# Patient Record
Sex: MALE | Race: WHITE | Employment: OTHER | URBAN - METROPOLITAN AREA
[De-identification: names, ages, dates, MRNs, and addresses within clinical notes are randomized per-mention and may not be internally consistent; named-entity substitution may affect disease eponyms.]

---

## 2019-03-12 ENCOUNTER — HOSPITAL ENCOUNTER (OUTPATIENT)
Dept: GENERAL RADIOLOGY | Age: 64
Discharge: HOME | End: 2019-03-14
Payer: COMMERCIAL

## 2019-03-12 DIAGNOSIS — R13.14 DYSPHAGIA, PHARYNGOESOPHAGEAL: ICD-10-CM

## 2019-03-12 DIAGNOSIS — R13.13 CRICOPHARYNGEAL DYSPHAGIA: ICD-10-CM

## 2019-03-12 PROCEDURE — 92611 MOTION FLUOROSCOPY/SWALLOW: CPT | Performed by: SPEECH-LANGUAGE PATHOLOGIST

## 2019-03-12 PROCEDURE — 74220 X-RAY XM ESOPHAGUS 1CNTRST: CPT

## 2019-03-12 PROCEDURE — 2500000003 HC RX 250 WO HCPCS: Performed by: RADIOLOGY

## 2019-03-12 PROCEDURE — 74230 X-RAY XM SWLNG FUNCJ C+: CPT

## 2019-03-12 PROCEDURE — 71046 X-RAY EXAM CHEST 2 VIEWS: CPT

## 2019-03-12 RX ADMIN — BARIUM SULFATE 45 G: 0.6 CREAM ORAL at 10:17

## 2019-03-12 RX ADMIN — BARIUM SULFATE 88 G: 960 POWDER, FOR SUSPENSION ORAL at 10:17

## 2019-03-12 RX ADMIN — BARIUM SULFATE 176 G: 960 POWDER, FOR SUSPENSION ORAL at 11:11

## 2019-03-12 RX ADMIN — BARIUM SULFATE 140 ML: 980 POWDER, FOR SUSPENSION ORAL at 11:10

## 2019-06-07 ENCOUNTER — HOSPITAL ENCOUNTER (OUTPATIENT)
Age: 64
Discharge: HOME | End: 2019-06-09

## 2019-06-07 PROCEDURE — 87081 CULTURE SCREEN ONLY: CPT

## 2019-06-07 PROCEDURE — 88305 TISSUE EXAM BY PATHOLOGIST: CPT

## 2019-06-08 LAB — CLOTEST: NORMAL

## 2020-02-17 ENCOUNTER — TELEPHONE (OUTPATIENT)
Dept: ENT CLINIC | Age: 65
End: 2020-02-17

## 2020-07-17 ENCOUNTER — HOSPITAL ENCOUNTER (OUTPATIENT)
Dept: CT IMAGING | Age: 65
Discharge: HOME | End: 2020-07-19
Payer: COMMERCIAL

## 2020-07-17 PROCEDURE — 71250 CT THORAX DX C-: CPT

## 2020-07-22 ENCOUNTER — TELEPHONE (OUTPATIENT)
Dept: PULMONOLOGY | Age: 65
End: 2020-07-22

## 2020-07-22 NOTE — TELEPHONE ENCOUNTER
This office called the patient for him to obtain a copy of his CT Chest from [facility]. Patient very anxious regarding the results of this imaging and requested to see doctor as quickly as possible. Patient was told that if he brings in the CT disc soon doctor can view the images quicker. Patient was agreeable.

## 2020-07-23 ENCOUNTER — TELEPHONE (OUTPATIENT)
Dept: PULMONOLOGY | Age: 65
End: 2020-07-23

## 2020-07-29 ENCOUNTER — HOSPITAL ENCOUNTER (OUTPATIENT)
Dept: PET IMAGING | Age: 65
Discharge: HOME | End: 2020-07-31
Payer: COMMERCIAL

## 2020-07-29 LAB — METER GLUCOSE: 83 MG/DL (ref 74–99)

## 2020-07-29 PROCEDURE — A9552 F18 FDG: HCPCS | Performed by: RADIOLOGY

## 2020-07-29 PROCEDURE — 82962 GLUCOSE BLOOD TEST: CPT

## 2020-07-29 PROCEDURE — 78815 PET IMAGE W/CT SKULL-THIGH: CPT

## 2020-07-29 PROCEDURE — 3430000000 HC RX DIAGNOSTIC RADIOPHARMACEUTICAL: Performed by: RADIOLOGY

## 2020-07-29 RX ORDER — FLUDEOXYGLUCOSE F 18 200 MCI/ML
15 INJECTION, SOLUTION INTRAVENOUS
Status: COMPLETED | OUTPATIENT
Start: 2020-07-29 | End: 2020-07-29

## 2020-07-29 RX ADMIN — FLUDEOXYGLUCOSE F 18 15 MILLICURIE: 200 INJECTION, SOLUTION INTRAVENOUS at 09:36

## 2020-08-04 ENCOUNTER — OFFICE VISIT (OUTPATIENT)
Dept: PULMONOLOGY | Age: 65
End: 2020-08-04
Payer: COMMERCIAL

## 2020-08-04 ENCOUNTER — TELEPHONE (OUTPATIENT)
Dept: PULMONOLOGY | Age: 65
End: 2020-08-04

## 2020-08-04 PROCEDURE — 99204 OFFICE O/P NEW MOD 45 MIN: CPT | Performed by: INTERNAL MEDICINE

## 2020-08-04 NOTE — PROGRESS NOTES
Department of Internal Medicine  Division of Pulmonary, Critical Care & Sleep Medicine  Pulmonary [address] [facility]                                             Pulmonary Clinic Consult     I had the pleasure of seeing  Jacki Woody in the [facility] regarding their Lung nodule       Chief Complaint   Patient presents with    New Patient     lung mass       HISTORY OF PRESENT ILLNESS:    Jacki Woody is a 60y.o. year old  Who smoke for more than 30-35 years and has about 50 PPY smoking and work in [address] [place] [address] Ext   The Patient comes in with SOB and mid chest tightness at times and epithat has been going on for the last few  Associated with mild cough . ,He  states that it get worse with exercise or walking long distance and he can walk 1 block And go 1-2 flight of stairs before get short winded    No weight lossSleep history :  Snoring   Feel tired during the day  Wake up fresh  During the work up has CT chest that shows RUL nodule 1.6 along with other small less than 4 mm nodules  PET scan shows uptake 8 SUV   No mediastinal lesions                 ALLERGIES:    Allergies   Allergen Reactions    Ciprofloxacin      Fever         PAST MEDICAL HISTORY:       Diagnosis Date    Hypertension     unstable blood pressure       MEDICATIONS:   Current Outpatient Medications   Medication Sig Dispense Refill    naproxen (NAPROSYN) 500 MG tablet Take 1 tablet by mouth 2 times daily for 30 doses 30 tablet 0    amLODIPine-atorvastatatin (CADUET) 5-10 MG per tablet Take 1 tablet by mouth daily. No current facility-administered medications for this visit. SOCIAL AND OCCUPATIONAL HEALTH:  Social History     Tobacco Use   Smoking Status Former Smoker    Years: 20.00    Types: Cigarettes   Smokeless Tobacco Never Used         SURGICAL HISTORY:   No past surgical history on file.     FAMILY HISTORY:   Lung cancer:No  DVT or PE No     REVIEW OF SYSTEMS:  Constitutional: General health is good . There has been no weight changes. No fevers, fatigue or weakness. Head: Patient denies any history of trauma, convulsive disorder or syncope. Skin:  Patient denies history of changes in pigmentation, eruptions or pruritus. No easy bruising or bleeding. EENT: no nasal congestion   Cardiovascular ,No chest pain ,No edema ,  Respiration:SOB: + ,THURSTON :+  Gastrointestinal:No GI bleed ,no melena  ,no hematemesis    Musculoskeletal: no joint pain ,no swelling  Neurological:no , syncope. Denies twitching, convulsions, loss of consciousness or memory. Endocrine:  . No history of goiter, exophthalmos or dryness of skin. The patient has no history of diabetes. Hematopoietic:  No history of bleeding disorders or easy bruising. Rheumatic:  No connective tissue disease history or polyarthritis/inflammatory joint disease. PHYSICAL EXAMINATION:  There were no vitals filed for this visit. 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 I will keep following with you ,should you have any concerns ,please contact me at [phone]     Sincerely,        Walter English MD  Pulmonary & Critical Care Medicine     NOTE: This report was transcribed using voice recognition software.  Every effort was made to ensure accuracy; however, inadvertent computerized transcription errors may

## 2020-08-04 NOTE — PROGRESS NOTES
TeleMedicine Video Visit    This visit was performed as a virtual video visit using a Telephone. Patient identification was verified at the start of the visit, including the patient's telephone number and physical location. I discussed with the patient the nature of our telehealth visits, that:     1. Due to the nature of an audio- video modality, the only components of a physical exam that could be done are the elements supported by direct observation. 2. I would evaluate the patient and recommend diagnostics and treatments based on my assessment. 3. If it was felt that the patient should be evaluated in clinic or an emergency room setting, then they would be directed there. 4. Our sessions are not being recorded and that personal health information is protected. 5. Our team would provide follow up care in person if/when the patient needs it. Patient does agree to proceed with telemedicine consultation. Patient's IIGNXARJ:[id number] [facility] [phone]   Physician  location [address] [place] [address] other people involved in call N/A      Time spent: Greater than 30    This visit was completed virtually using telephone. Dr. Tammy Collazo called to pt and review PET scan results. Discussed options for biopsy vs. Surgical removal for mass(concern for cancer diagnosis). Discussed referral to Dr. Marquetta Harada for surgical resection and diagnostic procedure. Pt also given options for referral to any surgeon/outside hospital. Pt agrees to referral to Dr. Marquetta Harada. And office to process referral today. Plan for PFT to be scheduled and office to notify of appt.

## 2020-08-05 ENCOUNTER — TELEPHONE (OUTPATIENT)
Dept: PULMONOLOGY | Age: 65
End: 2020-08-05

## 2020-08-05 NOTE — TELEPHONE ENCOUNTER
Called patient to inform him that his PFT is scheduled for 8-14-20 at 1:30 pm at the [facility]. He must arrive by 1:00 pm. He is to have no caffeine for 24 hours prior to test and no resp meds for at least 4 hours prior to test.    I also informed him that he will need a COVID test 4 days prior to his PFT.     Patient verbalized understanding

## 2020-08-10 ENCOUNTER — HOSPITAL ENCOUNTER (OUTPATIENT)
Age: 65
Discharge: HOME | End: 2020-08-12
Payer: COMMERCIAL

## 2020-08-10 PROCEDURE — U0003 INFECTIOUS AGENT DETECTION BY NUCLEIC ACID (DNA OR RNA); SEVERE ACUTE RESPIRATORY SYNDROME CORONAVIRUS 2 (SARS-COV-2) (CORONAVIRUS DISEASE [COVID-19]), AMPLIFIED PROBE TECHNIQUE, MAKING USE OF HIGH THROUGHPUT TECHNOLOGIES AS DESCRIBED BY CMS-2020-01-R: HCPCS

## 2020-08-11 ENCOUNTER — PREP FOR PROCEDURE (OUTPATIENT)
Dept: CARDIOTHORACIC SURGERY | Age: 65
End: 2020-08-11

## 2020-08-11 ENCOUNTER — INITIAL CONSULT (OUTPATIENT)
Dept: CARDIOTHORACIC SURGERY | Age: 65
End: 2020-08-11
Payer: COMMERCIAL

## 2020-08-11 ENCOUNTER — TELEPHONE (OUTPATIENT)
Dept: CARDIOTHORACIC SURGERY | Age: 65
End: 2020-08-11

## 2020-08-11 VITALS
SYSTOLIC BLOOD PRESSURE: 142 MMHG | HEIGHT: 67 IN | WEIGHT: 185 LBS | HEART RATE: 75 BPM | BODY MASS INDEX: 29.03 KG/M2 | DIASTOLIC BLOOD PRESSURE: 80 MMHG

## 2020-08-11 DIAGNOSIS — R91.1 PULMONARY NODULE: Primary | ICD-10-CM

## 2020-08-11 LAB
SARS-COV-2: NOT DETECTED
SOURCE: NORMAL

## 2020-08-11 PROCEDURE — 99205 OFFICE O/P NEW HI 60 MIN: CPT | Performed by: THORACIC SURGERY (CARDIOTHORACIC VASCULAR SURGERY)

## 2020-08-11 RX ORDER — SODIUM CHLORIDE 9 MG/ML
INJECTION, SOLUTION INTRAVENOUS CONTINUOUS
Status: CANCELLED | OUTPATIENT
Start: 2020-08-11

## 2020-08-11 RX ORDER — ATORVASTATIN CALCIUM 10 MG/1
10 TABLET, FILM COATED ORAL DAILY
COMMUNITY

## 2020-08-11 RX ORDER — SODIUM CHLORIDE 0.9 % (FLUSH) 0.9 %
10 SYRINGE (ML) INJECTION EVERY 12 HOURS SCHEDULED
Status: CANCELLED | OUTPATIENT
Start: 2020-08-11

## 2020-08-11 RX ORDER — PANTOPRAZOLE SODIUM 20 MG/1
20 TABLET, DELAYED RELEASE ORAL DAILY
COMMUNITY
End: 2021-09-29 | Stop reason: DRUGHIGH

## 2020-08-11 RX ORDER — CHLORHEXIDINE GLUCONATE 4 G/100ML
SOLUTION TOPICAL ONCE
Status: CANCELLED | OUTPATIENT
Start: 2020-08-11 | End: 2020-08-11

## 2020-08-11 RX ORDER — SODIUM CHLORIDE 0.9 % (FLUSH) 0.9 %
10 SYRINGE (ML) INJECTION PRN
Status: CANCELLED | OUTPATIENT
Start: 2020-08-11

## 2020-08-11 RX ORDER — CHLORHEXIDINE GLUCONATE ORAL RINSE 1.2 MG/ML
15 SOLUTION DENTAL ONCE
Status: CANCELLED | OUTPATIENT
Start: 2020-08-11 | End: 2020-08-11

## 2020-08-11 ASSESSMENT — ENCOUNTER SYMPTOMS
SHORTNESS OF BREATH: 1
CONSTIPATION: 0
ABDOMINAL PAIN: 0
COUGH: 1

## 2020-08-11 NOTE — PROGRESS NOTES
Subjective:      Patient ID: Asiya Silva is a 60 y.o. male. Chief Complaint   Patient presents with    Consultation     referral Dr Ivan Denver     This is a 60year old smoker, steel , who is referred by Dr. Katherine Lake for lung nodule. This gentleman has noticed CP associated with cough and SOB, progressively worsening. CT scan of the chest was done revealing a 1.6cm RUL nodule. PET was subsequently done and showed increased SUV uptake (8.4) in the mass, with no other uptake elsewhere. HPI    Review of Systems   Constitutional: Negative for chills and fever. Respiratory: Positive for cough and shortness of breath. Cardiovascular: Positive for chest pain. Negative for palpitations. Gastrointestinal: Negative for abdominal pain and constipation. Neurological: Negative for syncope and light-headedness. Objective:   Physical Exam  Constitutional:       General: He is not in acute distress. Cardiovascular:      Rate and Rhythm: Normal rate and regular rhythm. Pulmonary:      Effort: Pulmonary effort is normal.      Breath sounds: Normal breath sounds. Abdominal:      General: Abdomen is flat. Tenderness: There is no guarding. Skin:     General: Skin is warm and dry. Neurological:      Mental Status: He is oriented to person, place, and time. Psychiatric:         Mood and Affect: Mood normal.         Behavior: Behavior normal.         Assessment:      RUL cavitary lung nodule, PET positive    Tobacco abuse      Plan:      He is due PFTs this Friday but he has no limitations to his activity. PAT on 8/19 with OR 8/21 second case. All risks, benefits, alternatives and potential complications explained thoroughly including, but not limited to, bleeding, infection, lung injury, kidney injury, stroke, heart attack, prolonged ventilation, wound complication, need for re-operation, and death, and the patient agrees to proceed.   Smoking cessation education provided

## 2020-08-11 NOTE — TELEPHONE ENCOUNTER
Just an Corrie Franco this is a [address] [address]  and they have 7-10 day for review once received  Per Winnie Jones

## 2020-08-11 NOTE — PATIENT INSTRUCTIONS
Patient Education        Learning About Benefits From Quitting Smoking  How does quitting smoking make you healthier? If you're thinking about quitting smoking, you may have a few reasons to be smoke-free. Your health may be one of them. · When you quit smoking, you lower your risks for cancer, lung disease, heart attack, stroke, blood vessel disease, and blindness from macular degeneration. · When you're smoke-free, you get sick less often, and you heal faster. You are less likely to get colds, flu, bronchitis, and pneumonia. · As a nonsmoker, you may find that your mood is better and you are less stressed. When and how will you feel healthier? Quitting has real health benefits that start from day 1 of being smoke-free. And the longer you stay smoke-free, the healthier you get and the better you feel. The first hours  · After just 20 minutes, your blood pressure and heart rate go down. That means there's less stress on your heart and blood vessels. · Within 12 hours, the level of carbon monoxide in your blood drops back to normal. That makes room for more oxygen. With more oxygen in your body, you may notice that you have more energy than when you smoked. After 2 weeks  · Your lungs start to work better. · Your risk of heart attack starts to drop. After 1 month  · When your lungs are clear, you cough less and breathe deeper, so it's easier to be active. · Your sense of taste and smell return. That means you can enjoy food more than you have since you started smoking. Over the years  · Over the years, your risks of heart disease, heart attack, and stroke are lower. · After 10 years, your risk of dying from lung cancer is cut by about half. And your risk for many other types of cancer is lower too. How would quitting help others in your life? When you quit smoking, you improve the health of everyone who now breathes in your smoke.   · Their heart, lung, and cancer risks drop, much like yours.  · They are sick less. For babies and small children, living smoke-free means they're less likely to have ear infections, pneumonia, and bronchitis. · If you're a woman who is or will be pregnant someday, quitting smoking means a healthier . · Children who are close to you are less likely to become adult smokers. Where can you learn more? Go to https://chpepiceweb.MirageWorks. org and sign in to your FlowCo account. Enter [phone] 72 11 in the [employer] box to learn more about \"Learning About Benefits From Quitting Smoking. \"     If you do not have an account, please click on the \"Sign Up Now\" link. Current as of: 2020               Content Version: 12.5   Healthwise, Incorporated. Care instructions adapted under license by [facility] ([facility]). If you have questions about a medical condition or this instruction, always ask your healthcare professional. [place] [address] any warranty or liability for your use of this information. Patient Education        Learning About Stopping Smoking Before Surgery  How does smoking affect surgery risks? After a surgery, your body puts all of its energy into healing. Smoking makes this harder. It cuts the amount of oxygen available to your body to heal. And smoking makes infection and complications more of a risk. How does being smoke-free help you recover from surgery? When you quit smoking before surgery, you lower your risk of these things after surgery:  · Heart problems  · Breathing problems and pneumonia  · Wound infection  · Healing problems  With every day, week, or month that you've been smoke-free before surgery, you improve your chances of having a healthy recovery. Quitting also improves your health. Your risk of things like heart attack and stroke start to go down as soon as you quit. And the longer you're smoke-free, the lower your risk of things like cancer and lung disease.  When you're smoke-free, you get sick less often. You are less likely to get colds, flu, bronchitis, and pneumonia. How can you be smoke-free before and after surgery? Your doctor will help you set up the plan that best meets your needs. There are medicines that may help. You may want to attend a smoking cessation program to help you quit smoking. When you choose a program, look for one that has proven success. Ask your doctor for ideas. Ask your doctor if you can try medicine. You will greatly increase your chances of success if you take medicine along with getting counseling or joining a cessation program.  Here are some other things you can do:  · Ask your family, friends, and coworkers for support. You have a better chance of quitting if you have help and support. · Join a support group, such as Nicotine Anonymous, for people who are trying to quit smoking. Or use an online program, such as www.smokefree. gov or the American Lung Association's [place] from Smoking program (www.lungusa. org). · Set a quit date. Pick your date carefully so that it is not right in the middle of a big deadline or stressful time. After you quit, don't even take a puff. Get rid of all ashtrays and lighters after your last cigarette. Clean your house, car, and clothes so that they don't smell like smoke. · Learn how to be a nonsmoker. Think about ways you can avoid those things that make you reach for a cigarette. ? Avoid situations that put you at greatest risk for smoking. For some people, it's hard to have a drink with friends without smoking. Other people might skip a coffee break with coworkers who smoke. ? Change your daily routine. Take a different route to work, or eat a meal in a different place. · Cut down on stress. Calm yourself or release tension by doing an activity you enjoy, such as reading a book, taking a hot bath, or gardening.   · Some people find hypnosis, acupuncture, and massage helpful for ending the smoking habit.  · Eat a healthy diet and get regular exercise. Having healthy habits will help your body move past its craving for nicotine. · Be prepared to keep trying. Most people don't succeed the first few times they try to quit. Don't get mad at yourself if you smoke again. Make a list of things you learned. Then think about when you want to try again, such as next week, next month, or next year. Where can you learn more? Go to https://atHomestarsdiamante[employer].Polyglot Systems. org and sign in to your Groupize.com account. Enter K873 in the [employer] box to learn more about \"Learning About Stopping Smoking Before Surgery. \"     If you do not have an account, please click on the \"Sign Up Now\" link. Current as of: March 12, 2020               Content Version: 12.5  © 7231-2148 Healthwise, Incorporated. Care instructions adapted under license by [facility] ([facility]). If you have questions about a medical condition or this instruction, always ask your healthcare professional. [address] [place] [address] any warranty or liability for your use of this information.

## 2020-08-11 NOTE — LETTER
[address] [facility]  [address] [facility] [address] [place] [address]  Phone: [phone]  Fax: [phone]    Zion Ibarra MD        August 11, 2020     Esau Orona MD  [address] [place] [address] Dr John    Patient: Sadi Ortega  MR Number: [id number]  YOB: 1955  Date of Visit: 8/11/2020    Dear Dr. Esau Orona:    Thank you for the request for consultation for Lia Dietrich     Past Medical History:   Diagnosis Date    Hypertension     unstable blood pressure       History reviewed. No pertinent surgical history. Family History   Problem Relation Age of Onset    Heart Disease Father        Allergies   Allergen Reactions    Ciprofloxacin      Fever           Current Outpatient Medications:     atorvastatin (LIPITOR) 10 MG tablet, Take 10 mg by mouth daily, Disp: , Rfl:     pantoprazole (PROTONIX) 20 MG tablet, Take 20 mg by mouth daily, Disp: , Rfl:     amLODIPine-atorvastatatin (CADUET) 5-10 MG per tablet, Take 1 tablet by mouth daily. , Disp: , Rfl:     naproxen (NAPROSYN) 500 MG tablet, Take 1 tablet by mouth 2 times daily for 30 doses, Disp: 30 tablet, Rfl: 0    Social History     Tobacco Use    Smoking status: Former Smoker     Years: 20.00     Types: Cigarettes    Smokeless tobacco: Never Used   Substance Use Topics    Alcohol use: Yes     Alcohol/week: 0.0 standard drinks     Comment: few times per week       Vitals:    08/11/20 0848 08/11/20 0854   BP: (!) 146/86 (!) 142/80   Site: Left Upper Arm    Position: Sitting    Pulse: 75    Weight: 185 lb (83.9 kg)    Height: 5' 7\" (1.702 m)        Subjective:      Patient ID: Sadi Ortega is a 60 y.o. male. Chief Complaint   Patient presents with    Consultation     referral Dr Ayaka Lopez     This is a 60year old smoker, steel , who is referred by Dr. Joseph Smith for lung nodule.  This gentleman has noticed CP associated with cough

## 2020-08-11 NOTE — H&P
atient ID: Asiya Silva is a 60 y.o. male. Chief Complaint   Patient presents with    Consultation     referral Dr Ivan Denver     This is a 60year old smoker, steel , who is referred by Dr. Katherine Lake for lung nodule. This gentleman has noticed CP associated with cough and SOB, progressively worsening. CT scan of the chest was done revealing a 1.6cm RUL nodule. PET was subsequently done and showed increased SUV uptake (8.4) in the mass, with no other uptake elsewhere. Past Medical History:   Diagnosis Date    Hypertension     unstable blood pressure       History reviewed. No pertinent surgical history. Family History   Problem Relation Age of Onset    Heart Disease Father        Allergies   Allergen Reactions    Ciprofloxacin      Fever           Current Outpatient Medications:     atorvastatin (LIPITOR) 10 MG tablet, Take 10 mg by mouth daily, Disp: , Rfl:     pantoprazole (PROTONIX) 20 MG tablet, Take 20 mg by mouth daily, Disp: , Rfl:     amLODIPine-atorvastatatin (CADUET) 5-10 MG per tablet, Take 1 tablet by mouth daily. , Disp: , Rfl:     naproxen (NAPROSYN) 500 MG tablet, Take 1 tablet by mouth 2 times daily for 30 doses, Disp: 30 tablet, Rfl: 0    Social History     Tobacco Use    Smoking status: Former Smoker     Years: 20.00     Types: Cigarettes    Smokeless tobacco: Never Used   Substance Use Topics    Alcohol use: Yes     Alcohol/week: 0.0 standard drinks     Comment: few times per week       Vitals:    08/11/20 0848 08/11/20 0854   BP: (!) 146/86 (!) 142/80   Site: Left Upper Arm    Position: Sitting    Pulse: 75    Weight: 185 lb (83.9 kg)    Height: 5' 7\" (1.702 m)        Subjective:        Review of Systems   Constitutional: Negative for chills and fever. Respiratory: Positive for cough and shortness of breath. Cardiovascular: Positive for chest pain. Negative for palpitations. Gastrointestinal: Negative for abdominal pain and constipation. Neurological: Negative for syncope and light-headedness. Objective:   Physical Exam  Constitutional:       General: He is not in acute distress. Cardiovascular:      Rate and Rhythm: Normal rate and regular rhythm. Pulmonary:      Effort: Pulmonary effort is normal.      Breath sounds: Normal breath sounds. Abdominal:      General: Abdomen is flat. Tenderness: There is no guarding. Skin:     General: Skin is warm and dry. Neurological:      Mental Status: He is oriented to person, place, and time. Psychiatric:         Mood and Affect: Mood normal.         Behavior: Behavior normal.         Assessment:      RUL cavitary lung nodule, PET positive    Tobacco abuse      Plan:      PAT on 8/19 with OR 8/21 second case. All risks, benefits, alternatives and potential complications explained thoroughly including, but not limited to, bleeding, infection, lung injury, kidney injury, stroke, heart attack, prolonged ventilation, wound complication, need for re-operation, and death, and the patient agrees to proceed.   Smoking cessation education provided

## 2020-08-12 ENCOUNTER — TELEPHONE (OUTPATIENT)
Dept: CARDIOTHORACIC SURGERY | Age: 65
End: 2020-08-12

## 2020-08-14 ENCOUNTER — HOSPITAL ENCOUNTER (OUTPATIENT)
Dept: PULMONOLOGY | Age: 65
Discharge: HOME | End: 2020-08-14
Payer: COMMERCIAL

## 2020-08-14 PROCEDURE — 94729 DIFFUSING CAPACITY: CPT

## 2020-08-14 PROCEDURE — 94060 EVALUATION OF WHEEZING: CPT

## 2020-08-14 PROCEDURE — 94729 DIFFUSING CAPACITY: CPT | Performed by: INTERNAL MEDICINE

## 2020-08-14 PROCEDURE — 94726 PLETHYSMOGRAPHY LUNG VOLUMES: CPT

## 2020-08-14 PROCEDURE — 94726 PLETHYSMOGRAPHY LUNG VOLUMES: CPT | Performed by: INTERNAL MEDICINE

## 2020-08-14 PROCEDURE — 94060 EVALUATION OF WHEEZING: CPT | Performed by: INTERNAL MEDICINE

## 2020-08-17 ENCOUNTER — HOSPITAL ENCOUNTER (OUTPATIENT)
Age: 65
Discharge: HOME | End: 2020-08-19
Payer: COMMERCIAL

## 2020-08-17 PROCEDURE — U0003 INFECTIOUS AGENT DETECTION BY NUCLEIC ACID (DNA OR RNA); SEVERE ACUTE RESPIRATORY SYNDROME CORONAVIRUS 2 (SARS-COV-2) (CORONAVIRUS DISEASE [COVID-19]), AMPLIFIED PROBE TECHNIQUE, MAKING USE OF HIGH THROUGHPUT TECHNOLOGIES AS DESCRIBED BY CMS-2020-01-R: HCPCS

## 2020-08-18 LAB
SARS-COV-2: NOT DETECTED
SOURCE: NORMAL

## 2020-08-18 RX ORDER — ALPRAZOLAM 0.5 MG/1
1 TABLET ORAL DAILY PRN
COMMUNITY

## 2020-08-18 RX ORDER — AMLODIPINE BESYLATE 5 MG/1
5 TABLET ORAL DAILY
COMMUNITY

## 2020-08-19 ENCOUNTER — ANESTHESIA EVENT (OUTPATIENT)
Dept: OPERATING ROOM | Age: 65
DRG: 165 | End: 2020-08-19
Payer: COMMERCIAL

## 2020-08-19 ENCOUNTER — HOSPITAL ENCOUNTER (OUTPATIENT)
Dept: GENERAL RADIOLOGY | Age: 65
Discharge: HOME | DRG: 165 | End: 2020-08-21
Payer: COMMERCIAL

## 2020-08-19 ENCOUNTER — HOSPITAL ENCOUNTER (OUTPATIENT)
Dept: PREADMISSION TESTING | Age: 65
Setting detail: SURGERY ADMIT
Discharge: HOME | DRG: 165 | End: 2020-08-19
Payer: COMMERCIAL

## 2020-08-19 VITALS
HEIGHT: 67 IN | WEIGHT: 192 LBS | DIASTOLIC BLOOD PRESSURE: 63 MMHG | SYSTOLIC BLOOD PRESSURE: 129 MMHG | RESPIRATION RATE: 12 BRPM | OXYGEN SATURATION: 99 % | BODY MASS INDEX: 30.13 KG/M2 | HEART RATE: 77 BPM | TEMPERATURE: 98.3 F

## 2020-08-19 LAB
ABO/RH: NORMAL
ALBUMIN SERPL-MCNC: 4.4 G/DL (ref 3.5–5.2)
ALP BLD-CCNC: 68 U/L (ref 40–129)
ALT SERPL-CCNC: 22 U/L (ref 0–40)
ANION GAP SERPL CALCULATED.3IONS-SCNC: 10 MMOL/L (ref 7–16)
ANTIBODY SCREEN: NORMAL
AST SERPL-CCNC: 17 U/L (ref 0–39)
BILIRUB SERPL-MCNC: 0.7 MG/DL (ref 0–1.2)
BUN BLDV-MCNC: 16 MG/DL (ref 8–23)
CALCIUM SERPL-MCNC: 9.2 MG/DL (ref 8.6–10.2)
CHLORIDE BLD-SCNC: 99 MMOL/L (ref 98–107)
CO2: 30 MMOL/L (ref 22–29)
CREAT SERPL-MCNC: 1.2 MG/DL (ref 0.7–1.2)
GFR AFRICAN AMERICAN: >60
GFR NON-AFRICAN AMERICAN: >60 ML/MIN/1.73
GLUCOSE BLD-MCNC: 155 MG/DL (ref 74–99)
HCT VFR BLD CALC: 43.8 % (ref 37–54)
HEMOGLOBIN: 15.3 G/DL (ref 12.5–16.5)
INR BLD: 1
MCH RBC QN AUTO: 32 PG (ref 26–35)
MCHC RBC AUTO-ENTMCNC: 34.9 % (ref 32–34.5)
MCV RBC AUTO: 91.6 FL (ref 80–99.9)
PDW BLD-RTO: 12 FL (ref 11.5–15)
PLATELET # BLD: 128 E9/L (ref 130–450)
PMV BLD AUTO: 10.5 FL (ref 7–12)
POTASSIUM SERPL-SCNC: 4.6 MMOL/L (ref 3.5–5)
PROTHROMBIN TIME: 10.8 SEC (ref 9.3–12.4)
RBC # BLD: 4.78 E12/L (ref 3.8–5.8)
SODIUM BLD-SCNC: 139 MMOL/L (ref 132–146)
TOTAL PROTEIN: 6.8 G/DL (ref 6.4–8.3)
WBC # BLD: 5.9 E9/L (ref 4.5–11.5)

## 2020-08-19 PROCEDURE — 86850 RBC ANTIBODY SCREEN: CPT

## 2020-08-19 PROCEDURE — 86900 BLOOD TYPING SEROLOGIC ABO: CPT

## 2020-08-19 PROCEDURE — 85027 COMPLETE CBC AUTOMATED: CPT

## 2020-08-19 PROCEDURE — 86923 COMPATIBILITY TEST ELECTRIC: CPT

## 2020-08-19 PROCEDURE — 86901 BLOOD TYPING SEROLOGIC RH(D): CPT

## 2020-08-19 PROCEDURE — 80053 COMPREHEN METABOLIC PANEL: CPT

## 2020-08-19 PROCEDURE — 36415 COLL VENOUS BLD VENIPUNCTURE: CPT

## 2020-08-19 PROCEDURE — 87081 CULTURE SCREEN ONLY: CPT

## 2020-08-19 PROCEDURE — 85610 PROTHROMBIN TIME: CPT

## 2020-08-19 PROCEDURE — 71046 X-RAY EXAM CHEST 2 VIEWS: CPT

## 2020-08-19 NOTE — PROCEDURES
[address]                  [address] [facility],  [facility]                               PULMONARY FUNCTION    PATIENT NAME: Rancho Mckeon                 :        1955  MED REC NO:   [id number]                            ROOM:  ACCOUNT NO:   [de-identified]                           ADMIT DATE: 2020  PROVIDER:     Vannessa Ma MD    DATE OF PROCEDURE:  2020    WEIGHT:  185. HEIGHT:  67.    This PFT, the patient gave good effort. YEAR OF SMOKIN.    SPIROMETRY:  FVC 3.43 , which is 80. FEV1 2.84, which is 91. No  bronchodilator response. FEV1/FVC 83. . LUNG VOLUME:  Slow vital capacity 3.48, which is 86 of predicted. ERV  0.75, which is 80% of predicted. TLC is 5.64, which is 88.  RV 2.1,  which is 99.  RV/TLC 38, which is 112 of predicted. Diffusion capacity 28.65, which is 101, corrects for alveolar volume  121. IMPRESSION:  This is normal spirometry, no bronchodilator response,  normal lung volume, no air trapping, normal diffusion capacity. Clinical and radiological correlation indicated.         Debbie Peng MD    D: 2020 10:38:32       T: 2020 11:42:07     MA/GONSALO_EREN  Job#: [id number]     Doc#: [id number]    CC:

## 2020-08-20 LAB — MRSA CULTURE ONLY: NORMAL

## 2020-08-20 RX ORDER — ROPIVACAINE HYDROCHLORIDE 5 MG/ML
30 INJECTION, SOLUTION EPIDURAL; INFILTRATION; PERINEURAL ONCE
Status: CANCELLED | OUTPATIENT
Start: 2020-08-20

## 2020-08-20 RX ORDER — ACETAMINOPHEN 500 MG
1000 TABLET ORAL ONCE
Status: CANCELLED | OUTPATIENT
Start: 2020-08-20 | End: 2020-08-20

## 2020-08-20 RX ORDER — OXYCODONE HCL 10 MG/1
10 TABLET, FILM COATED, EXTENDED RELEASE ORAL ONCE
Status: CANCELLED | OUTPATIENT
Start: 2020-08-20 | End: 2020-08-20

## 2020-08-20 RX ORDER — MIDAZOLAM HYDROCHLORIDE 1 MG/ML
1 INJECTION INTRAMUSCULAR; INTRAVENOUS PRN
Status: CANCELLED | OUTPATIENT
Start: 2020-08-20

## 2020-08-20 RX ORDER — CELECOXIB 100 MG/1
200 CAPSULE ORAL ONCE
Status: CANCELLED | OUTPATIENT
Start: 2020-08-20 | End: 2020-08-20

## 2020-08-21 ENCOUNTER — ANESTHESIA (OUTPATIENT)
Dept: OPERATING ROOM | Age: 65
DRG: 165 | End: 2020-08-21
Payer: COMMERCIAL

## 2020-08-21 ENCOUNTER — HOSPITAL ENCOUNTER (INPATIENT)
Age: 65
LOS: 1 days | Discharge: HOME | DRG: 165 | End: 2020-08-22
Attending: THORACIC SURGERY (CARDIOTHORACIC VASCULAR SURGERY) | Admitting: THORACIC SURGERY (CARDIOTHORACIC VASCULAR SURGERY)
Payer: COMMERCIAL

## 2020-08-21 ENCOUNTER — APPOINTMENT (OUTPATIENT)
Dept: GENERAL RADIOLOGY | Age: 65
DRG: 165 | End: 2020-08-21
Attending: THORACIC SURGERY (CARDIOTHORACIC VASCULAR SURGERY)
Payer: COMMERCIAL

## 2020-08-21 VITALS
OXYGEN SATURATION: 100 % | DIASTOLIC BLOOD PRESSURE: 80 MMHG | TEMPERATURE: 95.5 F | RESPIRATION RATE: 16 BRPM | SYSTOLIC BLOOD PRESSURE: 123 MMHG

## 2020-08-21 PROBLEM — R91.1 NODULE OF RIGHT LUNG: Status: ACTIVE | Noted: 2020-08-21

## 2020-08-21 LAB
ANION GAP SERPL CALCULATED.3IONS-SCNC: 13 MMOL/L (ref 7–16)
BLOOD BANK DISPENSE STATUS: NORMAL
BLOOD BANK PRODUCT CODE: NORMAL
BPU ID: NORMAL
BUN BLDV-MCNC: 20 MG/DL (ref 8–23)
CALCIUM SERPL-MCNC: 8.5 MG/DL (ref 8.6–10.2)
CHLORIDE BLD-SCNC: 103 MMOL/L (ref 98–107)
CO2: 23 MMOL/L (ref 22–29)
CREAT SERPL-MCNC: 1.2 MG/DL (ref 0.7–1.2)
DESCRIPTION BLOOD BANK: NORMAL
GFR AFRICAN AMERICAN: >60
GFR NON-AFRICAN AMERICAN: >60 ML/MIN/1.73
GLUCOSE BLD-MCNC: 194 MG/DL (ref 74–99)
HCT VFR BLD CALC: 41.7 % (ref 37–54)
HEMOGLOBIN: 14.5 G/DL (ref 12.5–16.5)
MAGNESIUM: 2.8 MG/DL (ref 1.6–2.6)
MCH RBC QN AUTO: 31.5 PG (ref 26–35)
MCHC RBC AUTO-ENTMCNC: 34.8 % (ref 32–34.5)
MCV RBC AUTO: 90.7 FL (ref 80–99.9)
PDW BLD-RTO: 11.9 FL (ref 11.5–15)
PLATELET # BLD: 123 E9/L (ref 130–450)
PMV BLD AUTO: 10.4 FL (ref 7–12)
POTASSIUM SERPL-SCNC: 3.9 MMOL/L (ref 3.5–5)
RBC # BLD: 4.6 E12/L (ref 3.8–5.8)
SODIUM BLD-SCNC: 139 MMOL/L (ref 132–146)
WBC # BLD: 12.6 E9/L (ref 4.5–11.5)

## 2020-08-21 PROCEDURE — 36415 COLL VENOUS BLD VENIPUNCTURE: CPT

## 2020-08-21 PROCEDURE — 88331 PATH CONSLTJ SURG 1 BLK 1SPC: CPT

## 2020-08-21 PROCEDURE — 32668 THORACOSCOPY W/W RESECT DIAG: CPT | Performed by: THORACIC SURGERY (CARDIOTHORACIC VASCULAR SURGERY)

## 2020-08-21 PROCEDURE — 6370000000 HC RX 637 (ALT 250 FOR IP): Performed by: PHYSICIAN ASSISTANT

## 2020-08-21 PROCEDURE — 85027 COMPLETE CBC AUTOMATED: CPT

## 2020-08-21 PROCEDURE — 88309 TISSUE EXAM BY PATHOLOGIST: CPT

## 2020-08-21 PROCEDURE — 7100000000 HC PACU RECOVERY - FIRST 15 MIN: Performed by: THORACIC SURGERY (CARDIOTHORACIC VASCULAR SURGERY)

## 2020-08-21 PROCEDURE — 2580000003 HC RX 258

## 2020-08-21 PROCEDURE — 3700000000 HC ANESTHESIA ATTENDED CARE: Performed by: THORACIC SURGERY (CARDIOTHORACIC VASCULAR SURGERY)

## 2020-08-21 PROCEDURE — 83735 ASSAY OF MAGNESIUM: CPT

## 2020-08-21 PROCEDURE — 2720000010 HC SURG SUPPLY STERILE: Performed by: THORACIC SURGERY (CARDIOTHORACIC VASCULAR SURGERY)

## 2020-08-21 PROCEDURE — 32663 THORACOSCOPY W/LOBECTOMY: CPT | Performed by: PHYSICIAN ASSISTANT

## 2020-08-21 PROCEDURE — 2500000003 HC RX 250 WO HCPCS

## 2020-08-21 PROCEDURE — 6360000002 HC RX W HCPCS: Performed by: ANESTHESIOLOGY

## 2020-08-21 PROCEDURE — 71045 X-RAY EXAM CHEST 1 VIEW: CPT

## 2020-08-21 PROCEDURE — 2580000003 HC RX 258: Performed by: THORACIC SURGERY (CARDIOTHORACIC VASCULAR SURGERY)

## 2020-08-21 PROCEDURE — 3600000019 HC SURGERY ROBOT ADDTL 15MIN: Performed by: THORACIC SURGERY (CARDIOTHORACIC VASCULAR SURGERY)

## 2020-08-21 PROCEDURE — 6370000000 HC RX 637 (ALT 250 FOR IP): Performed by: THORACIC SURGERY (CARDIOTHORACIC VASCULAR SURGERY)

## 2020-08-21 PROCEDURE — 2700000000 HC OXYGEN THERAPY PER DAY

## 2020-08-21 PROCEDURE — 3700000001 HC ADD 15 MINUTES (ANESTHESIA): Performed by: THORACIC SURGERY (CARDIOTHORACIC VASCULAR SURGERY)

## 2020-08-21 PROCEDURE — 3600000009 HC SURGERY ROBOT BASE: Performed by: THORACIC SURGERY (CARDIOTHORACIC VASCULAR SURGERY)

## 2020-08-21 PROCEDURE — 32663 THORACOSCOPY W/LOBECTOMY: CPT | Performed by: THORACIC SURGERY (CARDIOTHORACIC VASCULAR SURGERY)

## 2020-08-21 PROCEDURE — 2140000000 HC CCU INTERMEDIATE R&B

## 2020-08-21 PROCEDURE — S2900 ROBOTIC SURGICAL SYSTEM: HCPCS | Performed by: THORACIC SURGERY (CARDIOTHORACIC VASCULAR SURGERY)

## 2020-08-21 PROCEDURE — 07B74ZZ EXCISION OF THORAX LYMPHATIC, PERCUTANEOUS ENDOSCOPIC APPROACH: ICD-10-PCS | Performed by: THORACIC SURGERY (CARDIOTHORACIC VASCULAR SURGERY)

## 2020-08-21 PROCEDURE — 32668 THORACOSCOPY W/W RESECT DIAG: CPT | Performed by: PHYSICIAN ASSISTANT

## 2020-08-21 PROCEDURE — 6360000002 HC RX W HCPCS

## 2020-08-21 PROCEDURE — 32674 THORACOSCOPY LYMPH NODE EXC: CPT | Performed by: THORACIC SURGERY (CARDIOTHORACIC VASCULAR SURGERY)

## 2020-08-21 PROCEDURE — 0BTC4ZZ RESECTION OF RIGHT UPPER LUNG LOBE, PERCUTANEOUS ENDOSCOPIC APPROACH: ICD-10-PCS | Performed by: THORACIC SURGERY (CARDIOTHORACIC VASCULAR SURGERY)

## 2020-08-21 PROCEDURE — 94640 AIRWAY INHALATION TREATMENT: CPT

## 2020-08-21 PROCEDURE — 2709999900 HC NON-CHARGEABLE SUPPLY: Performed by: THORACIC SURGERY (CARDIOTHORACIC VASCULAR SURGERY)

## 2020-08-21 PROCEDURE — 32674 THORACOSCOPY LYMPH NODE EXC: CPT | Performed by: PHYSICIAN ASSISTANT

## 2020-08-21 PROCEDURE — 6370000000 HC RX 637 (ALT 250 FOR IP): Performed by: ANESTHESIOLOGY

## 2020-08-21 PROCEDURE — 6360000002 HC RX W HCPCS: Performed by: THORACIC SURGERY (CARDIOTHORACIC VASCULAR SURGERY)

## 2020-08-21 PROCEDURE — 2500000003 HC RX 250 WO HCPCS: Performed by: THORACIC SURGERY (CARDIOTHORACIC VASCULAR SURGERY)

## 2020-08-21 PROCEDURE — 6360000002 HC RX W HCPCS: Performed by: PHYSICIAN ASSISTANT

## 2020-08-21 PROCEDURE — 7100000001 HC PACU RECOVERY - ADDTL 15 MIN: Performed by: THORACIC SURGERY (CARDIOTHORACIC VASCULAR SURGERY)

## 2020-08-21 PROCEDURE — 8E0W4CZ ROBOTIC ASSISTED PROCEDURE OF TRUNK REGION, PERCUTANEOUS ENDOSCOPIC APPROACH: ICD-10-PCS | Performed by: THORACIC SURGERY (CARDIOTHORACIC VASCULAR SURGERY)

## 2020-08-21 PROCEDURE — 76942 ECHO GUIDE FOR BIOPSY: CPT | Performed by: ANESTHESIOLOGY

## 2020-08-21 PROCEDURE — 88307 TISSUE EXAM BY PATHOLOGIST: CPT

## 2020-08-21 PROCEDURE — 80048 BASIC METABOLIC PNL TOTAL CA: CPT

## 2020-08-21 PROCEDURE — C2615 SEALANT, PULMONARY, LIQUID: HCPCS | Performed by: THORACIC SURGERY (CARDIOTHORACIC VASCULAR SURGERY)

## 2020-08-21 PROCEDURE — 3E0T3BZ INTRODUCTION OF ANESTHETIC AGENT INTO PERIPHERAL NERVES AND PLEXI, PERCUTANEOUS APPROACH: ICD-10-PCS | Performed by: THORACIC SURGERY (CARDIOTHORACIC VASCULAR SURGERY)

## 2020-08-21 DEVICE — SEALANT TISS GLUE 4ML PLEUR AIR LEAK PROGEL: Type: IMPLANTABLE DEVICE | Site: LUNG | Status: FUNCTIONAL

## 2020-08-21 RX ORDER — OXYCODONE HYDROCHLORIDE 5 MG/1
5 TABLET ORAL EVERY 4 HOURS PRN
Status: DISCONTINUED | OUTPATIENT
Start: 2020-08-21 | End: 2020-08-22 | Stop reason: HOSPADM

## 2020-08-21 RX ORDER — ONDANSETRON 2 MG/ML
4 INJECTION INTRAMUSCULAR; INTRAVENOUS
Status: DISCONTINUED | OUTPATIENT
Start: 2020-08-21 | End: 2020-08-21 | Stop reason: HOSPADM

## 2020-08-21 RX ORDER — ROCURONIUM BROMIDE 10 MG/ML
INJECTION, SOLUTION INTRAVENOUS PRN
Status: DISCONTINUED | OUTPATIENT
Start: 2020-08-21 | End: 2020-08-21 | Stop reason: SDUPTHER

## 2020-08-21 RX ORDER — PANTOPRAZOLE SODIUM 20 MG/1
20 TABLET, DELAYED RELEASE ORAL DAILY
Status: DISCONTINUED | OUTPATIENT
Start: 2020-08-22 | End: 2020-08-22 | Stop reason: HOSPADM

## 2020-08-21 RX ORDER — MEPERIDINE HYDROCHLORIDE 25 MG/ML
12.5 INJECTION INTRAMUSCULAR; INTRAVENOUS; SUBCUTANEOUS
Status: DISCONTINUED | OUTPATIENT
Start: 2020-08-21 | End: 2020-08-21 | Stop reason: HOSPADM

## 2020-08-21 RX ORDER — MIDAZOLAM HYDROCHLORIDE 1 MG/ML
INJECTION INTRAMUSCULAR; INTRAVENOUS PRN
Status: DISCONTINUED | OUTPATIENT
Start: 2020-08-21 | End: 2020-08-21 | Stop reason: SDUPTHER

## 2020-08-21 RX ORDER — MORPHINE SULFATE 2 MG/ML
1 INJECTION, SOLUTION INTRAMUSCULAR; INTRAVENOUS EVERY 5 MIN PRN
Status: DISCONTINUED | OUTPATIENT
Start: 2020-08-21 | End: 2020-08-21 | Stop reason: HOSPADM

## 2020-08-21 RX ORDER — SODIUM CHLORIDE 0.9 % (FLUSH) 0.9 %
10 SYRINGE (ML) INJECTION EVERY 12 HOURS SCHEDULED
Status: DISCONTINUED | OUTPATIENT
Start: 2020-08-21 | End: 2020-08-22 | Stop reason: HOSPADM

## 2020-08-21 RX ORDER — ACETAMINOPHEN 325 MG/1
650 TABLET ORAL EVERY 6 HOURS
Status: DISCONTINUED | OUTPATIENT
Start: 2020-08-21 | End: 2020-08-22 | Stop reason: HOSPADM

## 2020-08-21 RX ORDER — NEOSTIGMINE METHYLSULFATE 1 MG/ML
INJECTION, SOLUTION INTRAVENOUS PRN
Status: DISCONTINUED | OUTPATIENT
Start: 2020-08-21 | End: 2020-08-21 | Stop reason: SDUPTHER

## 2020-08-21 RX ORDER — LIDOCAINE HYDROCHLORIDE 20 MG/ML
INJECTION, SOLUTION INTRAVENOUS PRN
Status: DISCONTINUED | OUTPATIENT
Start: 2020-08-21 | End: 2020-08-21 | Stop reason: SDUPTHER

## 2020-08-21 RX ORDER — FENTANYL CITRATE 50 UG/ML
INJECTION, SOLUTION INTRAMUSCULAR; INTRAVENOUS PRN
Status: DISCONTINUED | OUTPATIENT
Start: 2020-08-21 | End: 2020-08-21 | Stop reason: SDUPTHER

## 2020-08-21 RX ORDER — EPHEDRINE SULFATE/0.9% NACL/PF 50 MG/5 ML
SYRINGE (ML) INTRAVENOUS PRN
Status: DISCONTINUED | OUTPATIENT
Start: 2020-08-21 | End: 2020-08-21 | Stop reason: SDUPTHER

## 2020-08-21 RX ORDER — SODIUM CHLORIDE 0.9 % (FLUSH) 0.9 %
10 SYRINGE (ML) INJECTION PRN
Status: DISCONTINUED | OUTPATIENT
Start: 2020-08-21 | End: 2020-08-21 | Stop reason: HOSPADM

## 2020-08-21 RX ORDER — KETOROLAC TROMETHAMINE 30 MG/ML
15 INJECTION, SOLUTION INTRAMUSCULAR; INTRAVENOUS EVERY 6 HOURS
Status: DISCONTINUED | OUTPATIENT
Start: 2020-08-21 | End: 2020-08-22 | Stop reason: HOSPADM

## 2020-08-21 RX ORDER — AMIODARONE HYDROCHLORIDE 200 MG/1
200 TABLET ORAL 2 TIMES DAILY
Status: DISCONTINUED | OUTPATIENT
Start: 2020-08-21 | End: 2020-08-22 | Stop reason: HOSPADM

## 2020-08-21 RX ORDER — PHENYLEPHRINE HCL IN 0.9% NACL 1 MG/10 ML
SYRINGE (ML) INTRAVENOUS PRN
Status: DISCONTINUED | OUTPATIENT
Start: 2020-08-21 | End: 2020-08-21 | Stop reason: SDUPTHER

## 2020-08-21 RX ORDER — SODIUM CHLORIDE 9 MG/ML
INJECTION, SOLUTION INTRAVENOUS CONTINUOUS PRN
Status: DISCONTINUED | OUTPATIENT
Start: 2020-08-21 | End: 2020-08-21 | Stop reason: SDUPTHER

## 2020-08-21 RX ORDER — CHLORHEXIDINE GLUCONATE 4 G/100ML
SOLUTION TOPICAL ONCE
Status: DISCONTINUED | OUTPATIENT
Start: 2020-08-21 | End: 2020-08-21 | Stop reason: HOSPADM

## 2020-08-21 RX ORDER — CHLORHEXIDINE GLUCONATE 0.12 MG/ML
15 RINSE ORAL ONCE
Status: COMPLETED | OUTPATIENT
Start: 2020-08-21 | End: 2020-08-21

## 2020-08-21 RX ORDER — ALPRAZOLAM 0.25 MG/1
0.5 TABLET ORAL DAILY PRN
Status: DISCONTINUED | OUTPATIENT
Start: 2020-08-22 | End: 2020-08-22 | Stop reason: HOSPADM

## 2020-08-21 RX ORDER — OXYCODONE HYDROCHLORIDE AND ACETAMINOPHEN 5; 325 MG/1; MG/1
1 TABLET ORAL PRN
Status: DISCONTINUED | OUTPATIENT
Start: 2020-08-21 | End: 2020-08-21 | Stop reason: HOSPADM

## 2020-08-21 RX ORDER — MIDAZOLAM HYDROCHLORIDE 1 MG/ML
1 INJECTION INTRAMUSCULAR; INTRAVENOUS PRN
Status: DISCONTINUED | OUTPATIENT
Start: 2020-08-21 | End: 2020-08-21 | Stop reason: HOSPADM

## 2020-08-21 RX ORDER — SODIUM CHLORIDE 9 MG/ML
INJECTION, SOLUTION INTRAVENOUS CONTINUOUS
Status: DISCONTINUED | OUTPATIENT
Start: 2020-08-21 | End: 2020-08-21

## 2020-08-21 RX ORDER — ROPIVACAINE HYDROCHLORIDE 5 MG/ML
INJECTION, SOLUTION EPIDURAL; INFILTRATION; PERINEURAL
Status: COMPLETED | OUTPATIENT
Start: 2020-08-21 | End: 2020-08-21

## 2020-08-21 RX ORDER — BISACODYL 10 MG
10 SUPPOSITORY, RECTAL RECTAL DAILY PRN
Status: DISCONTINUED | OUTPATIENT
Start: 2020-08-21 | End: 2020-08-22 | Stop reason: HOSPADM

## 2020-08-21 RX ORDER — SODIUM CHLORIDE 0.9 % (FLUSH) 0.9 %
10 SYRINGE (ML) INJECTION EVERY 12 HOURS SCHEDULED
Status: DISCONTINUED | OUTPATIENT
Start: 2020-08-21 | End: 2020-08-21 | Stop reason: HOSPADM

## 2020-08-21 RX ORDER — ACETAMINOPHEN 500 MG
1000 TABLET ORAL ONCE
Status: COMPLETED | OUTPATIENT
Start: 2020-08-21 | End: 2020-08-21

## 2020-08-21 RX ORDER — ONDANSETRON 2 MG/ML
4 INJECTION INTRAMUSCULAR; INTRAVENOUS EVERY 6 HOURS PRN
Status: DISCONTINUED | OUTPATIENT
Start: 2020-08-21 | End: 2020-08-22 | Stop reason: HOSPADM

## 2020-08-21 RX ORDER — PROPOFOL 10 MG/ML
INJECTION, EMULSION INTRAVENOUS PRN
Status: DISCONTINUED | OUTPATIENT
Start: 2020-08-21 | End: 2020-08-21 | Stop reason: SDUPTHER

## 2020-08-21 RX ORDER — AMLODIPINE BESYLATE 5 MG/1
5 TABLET ORAL DAILY
Status: DISCONTINUED | OUTPATIENT
Start: 2020-08-22 | End: 2020-08-22 | Stop reason: HOSPADM

## 2020-08-21 RX ORDER — ATORVASTATIN CALCIUM 10 MG/1
10 TABLET, FILM COATED ORAL DAILY
Status: DISCONTINUED | OUTPATIENT
Start: 2020-08-21 | End: 2020-08-22 | Stop reason: HOSPADM

## 2020-08-21 RX ORDER — SODIUM CHLORIDE 0.9 % (FLUSH) 0.9 %
10 SYRINGE (ML) INJECTION PRN
Status: DISCONTINUED | OUTPATIENT
Start: 2020-08-21 | End: 2020-08-22 | Stop reason: HOSPADM

## 2020-08-21 RX ORDER — CELECOXIB 100 MG/1
200 CAPSULE ORAL ONCE
Status: COMPLETED | OUTPATIENT
Start: 2020-08-21 | End: 2020-08-21

## 2020-08-21 RX ORDER — MORPHINE SULFATE 2 MG/ML
2 INJECTION, SOLUTION INTRAMUSCULAR; INTRAVENOUS EVERY 5 MIN PRN
Status: DISCONTINUED | OUTPATIENT
Start: 2020-08-21 | End: 2020-08-21 | Stop reason: HOSPADM

## 2020-08-21 RX ORDER — IPRATROPIUM BROMIDE AND ALBUTEROL SULFATE 2.5; .5 MG/3ML; MG/3ML
1 SOLUTION RESPIRATORY (INHALATION)
Status: DISCONTINUED | OUTPATIENT
Start: 2020-08-21 | End: 2020-08-22 | Stop reason: HOSPADM

## 2020-08-21 RX ORDER — ROPIVACAINE HYDROCHLORIDE 5 MG/ML
30 INJECTION, SOLUTION EPIDURAL; INFILTRATION; PERINEURAL ONCE
Status: DISCONTINUED | OUTPATIENT
Start: 2020-08-21 | End: 2020-08-21 | Stop reason: HOSPADM

## 2020-08-21 RX ORDER — MAGNESIUM SULFATE 1 G/100ML
1 INJECTION INTRAVENOUS PRN
Status: DISCONTINUED | OUTPATIENT
Start: 2020-08-21 | End: 2020-08-22 | Stop reason: HOSPADM

## 2020-08-21 RX ORDER — OXYCODONE HCL 10 MG/1
10 TABLET, FILM COATED, EXTENDED RELEASE ORAL ONCE
Status: COMPLETED | OUTPATIENT
Start: 2020-08-21 | End: 2020-08-21

## 2020-08-21 RX ORDER — OXYCODONE HYDROCHLORIDE AND ACETAMINOPHEN 5; 325 MG/1; MG/1
2 TABLET ORAL PRN
Status: DISCONTINUED | OUTPATIENT
Start: 2020-08-21 | End: 2020-08-21 | Stop reason: HOSPADM

## 2020-08-21 RX ORDER — KETOROLAC TROMETHAMINE 30 MG/ML
INJECTION, SOLUTION INTRAMUSCULAR; INTRAVENOUS PRN
Status: DISCONTINUED | OUTPATIENT
Start: 2020-08-21 | End: 2020-08-21 | Stop reason: SDUPTHER

## 2020-08-21 RX ORDER — SENNA AND DOCUSATE SODIUM 50; 8.6 MG/1; MG/1
1 TABLET, FILM COATED ORAL 2 TIMES DAILY
Status: DISCONTINUED | OUTPATIENT
Start: 2020-08-21 | End: 2020-08-22 | Stop reason: HOSPADM

## 2020-08-21 RX ORDER — ONDANSETRON 2 MG/ML
INJECTION INTRAMUSCULAR; INTRAVENOUS PRN
Status: DISCONTINUED | OUTPATIENT
Start: 2020-08-21 | End: 2020-08-21 | Stop reason: SDUPTHER

## 2020-08-21 RX ORDER — GLYCOPYRROLATE 1 MG/5 ML
SYRINGE (ML) INTRAVENOUS PRN
Status: DISCONTINUED | OUTPATIENT
Start: 2020-08-21 | End: 2020-08-21 | Stop reason: SDUPTHER

## 2020-08-21 RX ORDER — BUPIVACAINE HYDROCHLORIDE AND EPINEPHRINE 5; 5 MG/ML; UG/ML
INJECTION, SOLUTION EPIDURAL; INTRACAUDAL; PERINEURAL PRN
Status: DISCONTINUED | OUTPATIENT
Start: 2020-08-21 | End: 2020-08-21 | Stop reason: ALTCHOICE

## 2020-08-21 RX ORDER — AMIODARONE HYDROCHLORIDE 50 MG/ML
INJECTION, SOLUTION INTRAVENOUS PRN
Status: DISCONTINUED | OUTPATIENT
Start: 2020-08-21 | End: 2020-08-21 | Stop reason: SDUPTHER

## 2020-08-21 RX ORDER — HYDROMORPHONE HCL 110MG/55ML
PATIENT CONTROLLED ANALGESIA SYRINGE INTRAVENOUS PRN
Status: DISCONTINUED | OUTPATIENT
Start: 2020-08-21 | End: 2020-08-21 | Stop reason: SDUPTHER

## 2020-08-21 RX ADMIN — Medication 3 MG: at 11:45

## 2020-08-21 RX ADMIN — MIDAZOLAM 1 MG: 1 INJECTION INTRAMUSCULAR; INTRAVENOUS at 09:23

## 2020-08-21 RX ADMIN — IPRATROPIUM BROMIDE AND ALBUTEROL SULFATE 1 AMPULE: .5; 3 SOLUTION RESPIRATORY (INHALATION) at 20:23

## 2020-08-21 RX ADMIN — MIDAZOLAM 1 MG: 1 INJECTION INTRAMUSCULAR; INTRAVENOUS at 09:18

## 2020-08-21 RX ADMIN — ROPIVACAINE HYDROCHLORIDE 200 ML: 2 INJECTION, SOLUTION EPIDURAL; INFILTRATION at 22:03

## 2020-08-21 RX ADMIN — Medication 100 MCG: at 10:30

## 2020-08-21 RX ADMIN — ACETAMINOPHEN 650 MG: 325 TABLET, FILM COATED ORAL at 18:20

## 2020-08-21 RX ADMIN — Medication 2 G: at 10:04

## 2020-08-21 RX ADMIN — SODIUM CHLORIDE, PRESERVATIVE FREE 10 ML: 5 INJECTION INTRAVENOUS at 19:49

## 2020-08-21 RX ADMIN — ONDANSETRON HYDROCHLORIDE 4 MG: 2 INJECTION, SOLUTION INTRAMUSCULAR; INTRAVENOUS at 10:19

## 2020-08-21 RX ADMIN — SODIUM CHLORIDE: 9 INJECTION, SOLUTION INTRAVENOUS at 09:38

## 2020-08-21 RX ADMIN — LIDOCAINE HYDROCHLORIDE 100 MG: 20 INJECTION, SOLUTION INTRAVENOUS at 10:00

## 2020-08-21 RX ADMIN — CHLORHEXIDINE GLUCONATE 15 ML: 1.2 RINSE ORAL at 08:53

## 2020-08-21 RX ADMIN — ROCURONIUM BROMIDE 50 MG: 10 INJECTION, SOLUTION INTRAVENOUS at 10:00

## 2020-08-21 RX ADMIN — AMIODARONE HYDROCHLORIDE 200 MG: 200 TABLET ORAL at 19:49

## 2020-08-21 RX ADMIN — SODIUM CHLORIDE, PRESERVATIVE FREE 10 ML: 5 INJECTION INTRAVENOUS at 23:59

## 2020-08-21 RX ADMIN — PROPOFOL 200 MG: 10 INJECTION, EMULSION INTRAVENOUS at 10:00

## 2020-08-21 RX ADMIN — OXYCODONE HYDROCHLORIDE 10 MG: 10 TABLET, FILM COATED, EXTENDED RELEASE ORAL at 08:58

## 2020-08-21 RX ADMIN — ROPIVACAINE HYDROCHLORIDE 30 ML: 5 INJECTION EPIDURAL; INFILTRATION; PERINEURAL at 09:39

## 2020-08-21 RX ADMIN — ACETAMINOPHEN 1000 MG: 500 TABLET ORAL at 08:53

## 2020-08-21 RX ADMIN — KETOROLAC TROMETHAMINE 15 MG: 30 INJECTION, SOLUTION INTRAMUSCULAR at 23:59

## 2020-08-21 RX ADMIN — Medication 100 MCG: at 10:56

## 2020-08-21 RX ADMIN — HYDROMORPHONE HYDROCHLORIDE 0.5 MG: 2 INJECTION, SOLUTION INTRAMUSCULAR; INTRAVENOUS; SUBCUTANEOUS at 11:40

## 2020-08-21 RX ADMIN — Medication 2 G: at 18:20

## 2020-08-21 RX ADMIN — Medication 10 MG: at 10:34

## 2020-08-21 RX ADMIN — Medication 0.6 MG: at 11:45

## 2020-08-21 RX ADMIN — MIDAZOLAM 2 MG: 1 INJECTION INTRAMUSCULAR; INTRAVENOUS at 09:41

## 2020-08-21 RX ADMIN — ROPIVACAINE HYDROCHLORIDE 10 ML: 2 INJECTION, SOLUTION EPIDURAL; INFILTRATION at 11:19

## 2020-08-21 RX ADMIN — FENTANYL CITRATE 150 MCG: 50 INJECTION, SOLUTION INTRAMUSCULAR; INTRAVENOUS at 10:00

## 2020-08-21 RX ADMIN — Medication 20 MG: at 11:12

## 2020-08-21 RX ADMIN — KETOROLAC TROMETHAMINE 30 MG: 30 INJECTION, SOLUTION INTRAMUSCULAR; INTRAVENOUS at 11:28

## 2020-08-21 RX ADMIN — Medication 10 MG: at 10:44

## 2020-08-21 RX ADMIN — Medication 10 MG: at 10:15

## 2020-08-21 RX ADMIN — SODIUM CHLORIDE: 9 INJECTION, SOLUTION INTRAVENOUS at 10:06

## 2020-08-21 RX ADMIN — AMIODARONE HYDROCHLORIDE 150 MG: 50 INJECTION, SOLUTION INTRAVENOUS at 10:09

## 2020-08-21 RX ADMIN — HYDROMORPHONE HYDROCHLORIDE 0.5 MG: 2 INJECTION, SOLUTION INTRAMUSCULAR; INTRAVENOUS; SUBCUTANEOUS at 11:45

## 2020-08-21 RX ADMIN — KETOROLAC TROMETHAMINE 15 MG: 30 INJECTION, SOLUTION INTRAMUSCULAR at 18:18

## 2020-08-21 RX ADMIN — CELECOXIB 200 MG: 100 CAPSULE ORAL at 08:54

## 2020-08-21 RX ADMIN — DOCUSATE SODIUM 50 MG AND SENNOSIDES 8.6 MG 1 TABLET: 8.6; 5 TABLET, FILM COATED ORAL at 19:49

## 2020-08-21 RX ADMIN — ATORVASTATIN CALCIUM 10 MG: 10 TABLET, FILM COATED ORAL at 18:20

## 2020-08-21 ASSESSMENT — PULMONARY FUNCTION TESTS
PIF_VALUE: 14
PIF_VALUE: 32
PIF_VALUE: 21
PIF_VALUE: 12
PIF_VALUE: 20
PIF_VALUE: 29
PIF_VALUE: 32
PIF_VALUE: 33
PIF_VALUE: 34
PIF_VALUE: 30
PIF_VALUE: 36
PIF_VALUE: 32
PIF_VALUE: 20
PIF_VALUE: 15
PIF_VALUE: 34
PIF_VALUE: 12
PIF_VALUE: 35
PIF_VALUE: 28
PIF_VALUE: 34
PIF_VALUE: 34
PIF_VALUE: 28
PIF_VALUE: 34
PIF_VALUE: 31
PIF_VALUE: 18
PIF_VALUE: 14
PIF_VALUE: 28
PIF_VALUE: 12
PIF_VALUE: 35
PIF_VALUE: 3
PIF_VALUE: 34
PIF_VALUE: 34
PIF_VALUE: 2
PIF_VALUE: 21
PIF_VALUE: 34
PIF_VALUE: 12
PIF_VALUE: 1
PIF_VALUE: 32
PIF_VALUE: 23
PIF_VALUE: 32
PIF_VALUE: 34
PIF_VALUE: 1
PIF_VALUE: 32
PIF_VALUE: 14
PIF_VALUE: 24
PIF_VALUE: 34
PIF_VALUE: 35
PIF_VALUE: 28
PIF_VALUE: 9
PIF_VALUE: 32
PIF_VALUE: 18
PIF_VALUE: 24
PIF_VALUE: 31
PIF_VALUE: 4
PIF_VALUE: 12
PIF_VALUE: 1
PIF_VALUE: 14
PIF_VALUE: 32
PIF_VALUE: 32
PIF_VALUE: 15
PIF_VALUE: 28
PIF_VALUE: 32
PIF_VALUE: 35
PIF_VALUE: 1
PIF_VALUE: 22
PIF_VALUE: 28
PIF_VALUE: 35
PIF_VALUE: 36
PIF_VALUE: 34
PIF_VALUE: 34
PIF_VALUE: 23
PIF_VALUE: 35
PIF_VALUE: 18
PIF_VALUE: 20
PIF_VALUE: 34
PIF_VALUE: 28
PIF_VALUE: 34
PIF_VALUE: 34
PIF_VALUE: 11
PIF_VALUE: 12
PIF_VALUE: 28
PIF_VALUE: 16
PIF_VALUE: 34
PIF_VALUE: 32
PIF_VALUE: 27
PIF_VALUE: 35
PIF_VALUE: 18
PIF_VALUE: 28
PIF_VALUE: 21
PIF_VALUE: 34
PIF_VALUE: 14
PIF_VALUE: 21
PIF_VALUE: 34
PIF_VALUE: 2
PIF_VALUE: 14
PIF_VALUE: 31
PIF_VALUE: 4
PIF_VALUE: 32
PIF_VALUE: 21
PIF_VALUE: 22
PIF_VALUE: 15
PIF_VALUE: 34
PIF_VALUE: 34
PIF_VALUE: 13
PIF_VALUE: 29
PIF_VALUE: 49
PIF_VALUE: 32
PIF_VALUE: 34
PIF_VALUE: 14
PIF_VALUE: 29
PIF_VALUE: 34
PIF_VALUE: 21
PIF_VALUE: 35
PIF_VALUE: 33
PIF_VALUE: 34
PIF_VALUE: 36
PIF_VALUE: 35
PIF_VALUE: 33
PIF_VALUE: 20
PIF_VALUE: 31
PIF_VALUE: 21
PIF_VALUE: 15
PIF_VALUE: 28
PIF_VALUE: 1
PIF_VALUE: 35
PIF_VALUE: 19
PIF_VALUE: 23
PIF_VALUE: 29
PIF_VALUE: 24
PIF_VALUE: 35
PIF_VALUE: 32
PIF_VALUE: 34
PIF_VALUE: 1
PIF_VALUE: 34
PIF_VALUE: 23
PIF_VALUE: 18
PIF_VALUE: 30
PIF_VALUE: 21

## 2020-08-21 ASSESSMENT — PAIN SCALES - GENERAL
PAINLEVEL_OUTOF10: 5
PAINLEVEL_OUTOF10: 0
PAINLEVEL_OUTOF10: 4

## 2020-08-21 ASSESSMENT — PAIN DESCRIPTION - PAIN TYPE
TYPE: SURGICAL PAIN
TYPE: ACUTE PAIN;SURGICAL PAIN

## 2020-08-21 ASSESSMENT — PAIN - FUNCTIONAL ASSESSMENT
PAIN_FUNCTIONAL_ASSESSMENT: 0-10
PAIN_FUNCTIONAL_ASSESSMENT: PREVENTS OR INTERFERES SOME ACTIVE ACTIVITIES AND ADLS

## 2020-08-21 ASSESSMENT — PAIN DESCRIPTION - LOCATION
LOCATION: RIB CAGE
LOCATION: BACK

## 2020-08-21 ASSESSMENT — PAIN DESCRIPTION - DESCRIPTORS: DESCRIPTORS: ACHING;DISCOMFORT;TENDER;SORE

## 2020-08-21 ASSESSMENT — PAIN DESCRIPTION - PROGRESSION: CLINICAL_PROGRESSION: NOT CHANGED

## 2020-08-21 ASSESSMENT — PAIN DESCRIPTION - ONSET: ONSET: ON-GOING

## 2020-08-21 ASSESSMENT — PAIN DESCRIPTION - ORIENTATION
ORIENTATION: MID
ORIENTATION: RIGHT

## 2020-08-21 ASSESSMENT — PAIN DESCRIPTION - FREQUENCY: FREQUENCY: CONTINUOUS

## 2020-08-21 NOTE — PROGRESS NOTES
Epidural bolus given per Dr. Tunde Lilly by Roberto Doyle RN, through Nursing Order.  Patient tolerating well & comfortable at this time with wife bedside

## 2020-08-21 NOTE — PROGRESS NOTES
INTRAOPERATIVE CONSULTATION (with FROZEN SECTION)    Nodule, Right upper lobe of lung, wedge excision: Positive for adenocarcinoma.     Nicci Haider MD

## 2020-08-21 NOTE — OP NOTE
[address]                  [address] [place],  [facility]                                OPERATIVE REPORT    PATIENT NAME: India Romero                 :        1955  MED REC NO:   [id number]                            ROOM:  ACCOUNT NO:   [de-identified]                           ADMIT DATE: 2020  PROVIDER:     Cristhian Benz MD    DATE OF PROCEDURE:  2020    PREOPERATIVE DIAGNOSIS:  Right upper lobe lung nodule. POSTOPERATIVE DIAGNOSES:  Right upper lobe lung nodule, adenocarcinoma. INDICATION:  Right upper lobe lung nodule, adenocarcinoma. SURGEON:  Cristhian Benz MD    ASSISTANT:  BARRIE Lai (no other resident was adequately trained  to be able to assist). Blaze Mejia was present and assisted throughout  the entire operation. COMPLICATIONS:  None, tolerated well. ESTIMATED BLOOD LOSS:  Approximately 100 mL. ANESTHESIA:  General endotracheal.    ANESTHESIA ATTENDING:  Maribell Booth DO    SPECIMENS OBTAINED:  Include right upper lobe wedge, right upper  lobectomy, and multiple lymph node stations. Please refer to path  report. DRAINS:  One 28-Nepalese chest tube. OPERATIONS PERFORMED:  1. Right robotic VATS. 2.  Robotic lysis of adhesions. 3.  Robotic right upper lobe wedge resection. 4.  Robotic right upper lobectomy. 5.  Mediastinal lymph node dissection. 6.  Intercostal nerve block. HISTORY:  This is a 62-year-old man, who had a smoking history, who was  found to have bilateral lung nodules, the one in the left was only about  4 mm, but the other one in the right was about 1.6 cm. PET scan was  done and it lit up in his right upper lobe nodule and nowhere else. PFTs were adequate for resection and he was referred for resection.   The  patient was described the procedure in full including risks and  complications including but not limited to bleeding, infection, need for  reoperation, hemothorax, pneumothorax, stroke, myocardial infarction,  and death and the patient agreed to proceed. FINDINGS:  On wedge, the mass was noted to be adenocarcinoma. Multiple  lymph node stations were removed. The right middle lobe and right lower  lobe came up nicely at the end of the operation. DESCRIPTION OF OPERATION:  After adequate informed consent was obtained  and adequate preoperative antibiotics were given, the patient was  brought to the operating room in stable condition. He was laid in the  supine position and induced under general endotracheal anesthesia by  Anesthesia staff. Gallardo catheter and adequate monitoring lines were  placed. The patient was turned to the left lateral decubitus position  with right side up. All pressure points were padded. The four robotic  ports and a 12-mm access port were placed in the usual fashion. The  robot was docked and visualization inside the chest was adequate. The  diaphragm was noted to be somewhat elevated, which made the operation a  little bit more difficult, but we were able to maneuver around it. We  easily identified the mass and using a DEDE stapler, it was wedged out  and placed in a bag and removed. Frozen section revealed  adenocarcinoma. Therefore, we started our dissection inferiorly and  released the inferior pulmonary ligament, carrying this posteriorly into  the subcarinal space where lymph nodes were removed. This was packed  with Surgicel. I then dissected out the superior mediastinal lymph node  packet and this also was packed with Surgicel. I carried the dissection  inferior to the azygos and dissected out the anterior trunk of the  pulmonary artery as well as the superior pulmonary vein, identifying the  vein to the middle lobe and preserving this. The superior pulmonary  vein was then taken using Endo DEDE stapler followed by the anterior  trunk.   Finally, the minor fissure, which was completely underdeveloped,  was created using Endo DEDE stapler and all that remained was the  posterior ascending and the bronchus and the remainder of the major  fissure, which were taken using Endo DEDE staplers respectively. The  lobe was put in a bag. Bronchial margin was negative. Progel was  sprayed overlying the raw surfaces. The ports were removed under direct  vision. A 28-Ukrainian chest tube was placed at the apex and secured  appropriately. We visualized the middle lobe and lower lobe expanding  nicely with no torsion on the middle lobe. The incisions were closed in  multiple layers of absorbable stitch. Dry sterile dressing was applied. The patient tolerated the procedure well. The patient was extubated on  the table and transferred to recovery room in stable condition. All  sponge, instrument, and needle counts were correct at the end of the  case.         Madonna Muro MD    D: 08/21/2020 11:49:55       T: 08/21/2020 14:46:13     [id number]  Job#: [id number]     Doc#: [id number]    CC:  Sherrilyn Sacks, DO Kathyrn Hipps, MD Tess Diver, MD

## 2020-08-21 NOTE — PROGRESS NOTES
Epidural bolus given per Dr. Emanuel Norris by Rosa Mackay RN, through Nursing Order. Patient tolerating well & comfortable at this time with wife bedside.

## 2020-08-21 NOTE — ANESTHESIA PROCEDURE NOTES
Arterial Line:    An arterial line was placed using surface landmarks, in the OR for the following indication(s): continuous blood pressure monitoring and blood sampling needed. A 20 gauge (size), 1 and 3/4 inch (length), Arrow (type) catheter was placed, Seldinger technique used, into theradial artery, secured by Tegaderm and tape. Anesthesia type: General    Events:  patient tolerated procedure well with no complications.   Anesthesiologist: Blanca Flores DO  Resident/CRNA: [id number] Vieyra CRNA  Performed: Resident/CRNA   Preanesthetic Checklist  Completed: patient identified, site marked, surgical consent, pre-op evaluation, timeout performed, IV checked, risks and benefits discussed, monitors and equipment checked, anesthesia consent given, oxygen available and patient being monitored

## 2020-08-21 NOTE — PROGRESS NOTES
Epidural bolus given per Dr. Kameron Bright by Tomas Sam RN per verbal order. Patient tolerating well & comfortable at this time with wife bedside.

## 2020-08-21 NOTE — ANESTHESIA PRE PROCEDURE
Department of Anesthesiology  Preprocedure Note       Name:  Sadi Ortega   Age:  60 y.o.  :  1955                                          MRN:  [id number]         Date:  2020      Surgeon: Alf Moreno):  Zion Ibarra MD    Procedure: Procedure(s):  BRONCHOSCOPY RIGHT THORACOSCOPY ROBOTIC VIDEO ASSISTED XI WITH UPPER LOBE WEDGE, POSS. LOBECTOMY--(WANTS DR Alvarado Boateng FOR ANESTH)    Medications prior to admission:   Prior to Admission medications    Medication Sig Start Date End Date Taking? Authorizing Provider   amLODIPine (NORVASC) 5 MG tablet Take 5 mg by mouth daily   Yes Historical Provider, MD   ALPRAZolam (XANAX) 0.5 MG tablet Take 1 tablet by mouth daily as needed.    Yes Historical Provider, MD   pantoprazole (PROTONIX) 20 MG tablet Take 20 mg by mouth daily   Yes Historical Provider, MD   Cholecalciferol (VITAMIN D3 PO) Take 1 capsule by mouth daily    Historical Provider, MD   Multiple Vitamins-Minerals (MULTIVITAMIN ADULT PO) Take 1 tablet by mouth daily    Historical Provider, MD   atorvastatin (LIPITOR) 10 MG tablet Take 10 mg by mouth daily    Historical Provider, MD       Current medications:    Current Facility-Administered Medications   Medication Dose Route Frequency Provider Last Rate Last Dose    0.9 % sodium chloride infusion   Intravenous Continuous Floyce BARRIE Reyes        ceFAZolin (ANCEF) 2 g in sterile water 20 mL IV syringe  2 g Intravenous On Call to [address] [place], [place]        chlorhexidine (HIBICLENS) 4 % liquid   Topical Once Floyce [place]        chlorhexidine (PERIDEX) 0.12 % solution 15 mL  15 mL Mouth/Throat Once Floyce [place]        sodium chloride flush 0.9 % injection 10 mL  10 mL Intravenous 2 times per day Floyce [facility] [place]        sodium chloride flush 0.9 % injection 10 mL  10 mL Intravenous PRN Floyce [place]        acetaminophen (TYLENOL) tablet 1,000 mg  1,000 mg Oral Once Arun Valente DO        celecoxib (CELEBREX) capsule 200 consumption: 08/20/20    BMI:   Wt Readings from Last 3 Encounters:   08/21/20 192 lb (87.1 kg)   08/19/20 192 lb (87.1 kg)   08/11/20 185 lb (83.9 kg)     Body mass index is 30.07 kg/m². CBC:   Lab Results   Component Value Date    WBC 5.9 08/19/2020    RBC 4.78 08/19/2020    HGB 15.3 08/19/2020    HCT 43.8 08/19/2020    MCV 91.6 08/19/2020    RDW 12.0 08/19/2020     08/19/2020       CMP:   Lab Results   Component Value Date     08/19/2020    K 4.6 08/19/2020    CL 99 08/19/2020    CO2 30 08/19/2020    BUN 16 08/19/2020    CREATININE 1.2 08/19/2020    GFRAA >60 08/19/2020    LABGLOM >60 08/19/2020    GLUCOSE 155 08/19/2020    PROT 6.8 08/19/2020    CALCIUM 9.2 08/19/2020    BILITOT 0.7 08/19/2020    ALKPHOS 68 08/19/2020    AST 17 08/19/2020    ALT 22 08/19/2020       POC Tests: No results for input(s): POCGLU, POCNA, POCK, POCCL, POCBUN, POCHEMO, POCHCT in the last 72 hours.     Coags:   Lab Results   Component Value Date    PROTIME 10.8 08/19/2020    INR 1.0 08/19/2020       HCG (If Applicable): No results found for: PREGTESTUR, PREGSERUM, HCG, HCGQUANT     ABGs: No results found for: PHART, PO2ART, FQJ5NLE, TTK8JBV, BEART, I3SAZALI     Type & Screen (If Applicable):  No results found for: LABABO, LABRH    Drug/Infectious Status (If Applicable):  No results found for: HIV, HEPCAB    COVID-19 Screening (If Applicable):   Lab Results   Component Value Date    COVID19 Not Detected 08/17/2020         Anesthesia Evaluation  Patient summary reviewed and Nursing notes reviewed no history of anesthetic complications:   Airway: Mallampati: II  TM distance: >3 FB   Neck ROM: full  Mouth opening: > = 3 FB Dental: normal exam         Pulmonary:normal exam  breath sounds clear to auscultation                            ROS comment: RUL lesion    former smoker   Cardiovascular:  Exercise tolerance: good (>4 METS),   (+) hypertension:,         Rhythm: regular  Rate: normal                    Neuro/Psych: Negative Neuro/Psych ROS              GI/Hepatic/Renal: Neg GI/Hepatic/Renal ROS            Endo/Other: Negative Endo/Other ROS                    Abdominal:           Vascular: negative vascular ROS. Anesthesia Plan      general and regional     ASA 2       Induction: intravenous. MIPS: Postoperative opioids intended, Prophylactic antiemetics administered, Postoperative trial extubation and One-lung ventilation. Anesthetic plan and risks discussed with patient. Plan discussed with CRNA.                   Elvi Pina,    8/21/2020

## 2020-08-21 NOTE — PROGRESS NOTES
H&P Update     Patient's History and Physical from 8/11/20 was reviewed. Patient examined. There has been no change.     Corinne Draper MD

## 2020-08-21 NOTE — PROGRESS NOTES
COVID testing completed on: 8/17/2020  Results of the test: negative  The patient verbally confirms that they did adhere to the self-quarantine guidelines. No signs or symptoms expressed or observed.

## 2020-08-21 NOTE — PROGRESS NOTES
Epidural bolus given per Dr. Marisol Ordonez by Marcelo Ramírez RN, through Nursing Order. Patient tolerating well & comfortable at this time with wife bedside.

## 2020-08-22 VITALS
WEIGHT: 192 LBS | HEIGHT: 67 IN | DIASTOLIC BLOOD PRESSURE: 70 MMHG | RESPIRATION RATE: 18 BRPM | BODY MASS INDEX: 30.13 KG/M2 | TEMPERATURE: 98.3 F | HEART RATE: 107 BPM | OXYGEN SATURATION: 97 % | SYSTOLIC BLOOD PRESSURE: 148 MMHG

## 2020-08-22 LAB
ANION GAP SERPL CALCULATED.3IONS-SCNC: 13 MMOL/L (ref 7–16)
BUN BLDV-MCNC: 19 MG/DL (ref 8–23)
CALCIUM SERPL-MCNC: 8.7 MG/DL (ref 8.6–10.2)
CHLORIDE BLD-SCNC: 95 MMOL/L (ref 98–107)
CO2: 23 MMOL/L (ref 22–29)
CREAT SERPL-MCNC: 1 MG/DL (ref 0.7–1.2)
GFR AFRICAN AMERICAN: >60
GFR NON-AFRICAN AMERICAN: >60 ML/MIN/1.73
GLUCOSE BLD-MCNC: 208 MG/DL (ref 74–99)
HCT VFR BLD CALC: 40.6 % (ref 37–54)
HEMOGLOBIN: 14.1 G/DL (ref 12.5–16.5)
MCH RBC QN AUTO: 31.4 PG (ref 26–35)
MCHC RBC AUTO-ENTMCNC: 34.7 % (ref 32–34.5)
MCV RBC AUTO: 90.4 FL (ref 80–99.9)
PDW BLD-RTO: 11.8 FL (ref 11.5–15)
PLATELET # BLD: 146 E9/L (ref 130–450)
PMV BLD AUTO: 11 FL (ref 7–12)
POTASSIUM SERPL-SCNC: 4.9 MMOL/L (ref 3.5–5)
RBC # BLD: 4.49 E12/L (ref 3.8–5.8)
SODIUM BLD-SCNC: 131 MMOL/L (ref 132–146)
WBC # BLD: 19.1 E9/L (ref 4.5–11.5)

## 2020-08-22 PROCEDURE — 80048 BASIC METABOLIC PNL TOTAL CA: CPT

## 2020-08-22 PROCEDURE — 6360000002 HC RX W HCPCS: Performed by: THORACIC SURGERY (CARDIOTHORACIC VASCULAR SURGERY)

## 2020-08-22 PROCEDURE — 94640 AIRWAY INHALATION TREATMENT: CPT

## 2020-08-22 PROCEDURE — 85027 COMPLETE CBC AUTOMATED: CPT

## 2020-08-22 PROCEDURE — 6370000000 HC RX 637 (ALT 250 FOR IP): Performed by: THORACIC SURGERY (CARDIOTHORACIC VASCULAR SURGERY)

## 2020-08-22 PROCEDURE — 2580000003 HC RX 258: Performed by: THORACIC SURGERY (CARDIOTHORACIC VASCULAR SURGERY)

## 2020-08-22 PROCEDURE — 99222 1ST HOSP IP/OBS MODERATE 55: CPT | Performed by: INTERNAL MEDICINE

## 2020-08-22 PROCEDURE — 36415 COLL VENOUS BLD VENIPUNCTURE: CPT

## 2020-08-22 RX ORDER — DOCUSATE SODIUM 100 MG/1
100 CAPSULE, LIQUID FILLED ORAL 2 TIMES DAILY PRN
Qty: 20 CAPSULE | Refills: 0 | Status: SHIPPED | OUTPATIENT
Start: 2020-08-22

## 2020-08-22 RX ORDER — AMIODARONE HYDROCHLORIDE 200 MG/1
200 TABLET ORAL DAILY
Qty: 7 TABLET | Refills: 0 | Status: SHIPPED | OUTPATIENT
Start: 2020-08-22 | End: 2020-09-24 | Stop reason: ALTCHOICE

## 2020-08-22 RX ORDER — HYDROCODONE BITARTRATE AND ACETAMINOPHEN 5; 325 MG/1; MG/1
1 TABLET ORAL EVERY 4 HOURS PRN
Qty: 30 TABLET | Refills: 0 | Status: SHIPPED | OUTPATIENT
Start: 2020-08-22 | End: 2020-08-27

## 2020-08-22 RX ADMIN — ACETAMINOPHEN 650 MG: 325 TABLET, FILM COATED ORAL at 05:58

## 2020-08-22 RX ADMIN — SODIUM CHLORIDE, PRESERVATIVE FREE 10 ML: 5 INJECTION INTRAVENOUS at 02:00

## 2020-08-22 RX ADMIN — AMIODARONE HYDROCHLORIDE 200 MG: 200 TABLET ORAL at 08:34

## 2020-08-22 RX ADMIN — ACETAMINOPHEN 650 MG: 325 TABLET, FILM COATED ORAL at 00:00

## 2020-08-22 RX ADMIN — PANTOPRAZOLE SODIUM 20 MG: 20 TABLET, DELAYED RELEASE ORAL at 08:37

## 2020-08-22 RX ADMIN — KETOROLAC TROMETHAMINE 15 MG: 30 INJECTION, SOLUTION INTRAMUSCULAR at 05:58

## 2020-08-22 RX ADMIN — KETOROLAC TROMETHAMINE 15 MG: 30 INJECTION, SOLUTION INTRAMUSCULAR at 10:45

## 2020-08-22 RX ADMIN — ENOXAPARIN SODIUM 40 MG: 40 INJECTION SUBCUTANEOUS at 08:36

## 2020-08-22 RX ADMIN — AMLODIPINE BESYLATE 5 MG: 5 TABLET ORAL at 08:39

## 2020-08-22 RX ADMIN — Medication 2 G: at 02:01

## 2020-08-22 RX ADMIN — SODIUM CHLORIDE, PRESERVATIVE FREE 10 ML: 5 INJECTION INTRAVENOUS at 05:58

## 2020-08-22 RX ADMIN — ATORVASTATIN CALCIUM 10 MG: 10 TABLET, FILM COATED ORAL at 08:40

## 2020-08-22 RX ADMIN — SODIUM CHLORIDE, PRESERVATIVE FREE 10 ML: 5 INJECTION INTRAVENOUS at 10:45

## 2020-08-22 RX ADMIN — ACETAMINOPHEN 650 MG: 325 TABLET, FILM COATED ORAL at 10:44

## 2020-08-22 RX ADMIN — IPRATROPIUM BROMIDE AND ALBUTEROL SULFATE 1 AMPULE: .5; 3 SOLUTION RESPIRATORY (INHALATION) at 09:09

## 2020-08-22 RX ADMIN — DOCUSATE SODIUM 50 MG AND SENNOSIDES 8.6 MG 1 TABLET: 8.6; 5 TABLET, FILM COATED ORAL at 08:34

## 2020-08-22 ASSESSMENT — PAIN SCALES - GENERAL
PAINLEVEL_OUTOF10: 5
PAINLEVEL_OUTOF10: 4
PAINLEVEL_OUTOF10: 3
PAINLEVEL_OUTOF10: 0
PAINLEVEL_OUTOF10: 0
PAINLEVEL_OUTOF10: 4

## 2020-08-22 ASSESSMENT — PAIN DESCRIPTION - PROGRESSION: CLINICAL_PROGRESSION: GRADUALLY WORSENING

## 2020-08-22 ASSESSMENT — PAIN DESCRIPTION - ONSET: ONSET: GRADUAL

## 2020-08-22 ASSESSMENT — PAIN DESCRIPTION - ORIENTATION: ORIENTATION: RIGHT

## 2020-08-22 ASSESSMENT — PAIN DESCRIPTION - DESCRIPTORS: DESCRIPTORS: ACHING;DISCOMFORT;TENDER

## 2020-08-22 ASSESSMENT — PAIN DESCRIPTION - LOCATION: LOCATION: RIB CAGE

## 2020-08-22 ASSESSMENT — PAIN DESCRIPTION - FREQUENCY: FREQUENCY: CONTINUOUS

## 2020-08-22 ASSESSMENT — PAIN - FUNCTIONAL ASSESSMENT: PAIN_FUNCTIONAL_ASSESSMENT: PREVENTS OR INTERFERES SOME ACTIVE ACTIVITIES AND ADLS

## 2020-08-22 ASSESSMENT — PAIN DESCRIPTION - PAIN TYPE: TYPE: SURGICAL PAIN

## 2020-08-22 NOTE — PLAN OF CARE
Problem: Pain:  Goal: Control of acute pain  Description: Control of acute pain  8/22/2020 0552 by Abel Krishnamurthy RN  Outcome: Met This Shift

## 2020-08-22 NOTE — PROGRESS NOTES
Epidural bolus given by Lizette Evangelista RN, per Dr Julio Fragoso orders. Pt tolerating well, and is resting comfortably in bed at this time. Will continue to monitor.

## 2020-08-22 NOTE — PROGRESS NOTES
POD#1  Awake, alert. No complaints. Denies CP, palpitations, SOB at rest, dizziness/lightheadedness. On RA. No leak from tube      Vitals:    08/21/20 2000 08/21/20 2355 08/22/20 0405 08/22/20 0800   BP:  129/60 125/60 (!) 148/70   Pulse: 90 93 91 107   Resp:  20 20 20   Temp:  98.5 °F (36.9 °C) 98.2 °F (36.8 °C)    TempSrc:  Temporal     SpO2:  98%  96%   Weight:       Height:         O2: RA L/NC      Intake/Output Summary (Last 24 hours) at 8/22/2020 0903  Last data filed at 8/22/2020 6817  Gross per 24 hour   Intake 1678 ml   Output 1285 ml   Net 393 ml       Recent Labs     08/21/20  1220 08/22/20  0724   WBC 12.6* 19.1*   HGB 14.5 14.1   HCT 41.7 40.6   * 146      Recent Labs     08/21/20  1220 08/22/20  0724   BUN 20 19   CREATININE 1.2 1.0         PE  Cardiac: RRR  Lungs: ctab  Chest incisions C/D/I, approximated, no erythema.  Chest tube in place, no leak  Abd: Soft, nontender, +BS  Ext: DURHAM      A/P: Stable s/p right robotic VATS, upper lobectomy;  POD#1  hgb stable  On RA  Tube without leak- removed  DC home today  This patient's case and care plan was discussed with the attending surgeon

## 2020-08-22 NOTE — PROGRESS NOTES
Removed Hrt monitor, IV and reviewed discharge paperwork with patient and wife at bedside. Waiting on OP Pharmacy to deliver patient's meds to room.

## 2020-08-22 NOTE — PROGRESS NOTES
Called to floor to pull patients epidural catheter from ROCHELLE block. Catheter pulled, blue tip intact, dressing applied. Patient tolerated well.

## 2020-08-22 NOTE — PROGRESS NOTES
SRNAs at bedside, new PCA bag hung, and pump verified against MAR. Orders and pump programmed accurately.

## 2020-08-24 NOTE — CONSULTS
Department of Internal Medicine  Division of Pulmonary, Critical Care & Sleep Medicine  Pulmonary [facility]                                             Pulmonary  Consult         Follow up on Lung cancer     HISTORY OF PRESENT ILLNESS:    Kiesha Goldberg is a 60y.o. year old  Who smoke for more than 30-35 years and has about 50 PPY smoking and work in [address] [place] [address]   The Patient comes in with SOB and mid chest tightness at times and epithat has been going on for the last few  Associated with mild cough . ,He  states that it get worse with exercise or walking long distance and he can walk 1 block And go 1-2 flight of stairs before get short winded    No weight lossSleep history :  Snoring   Feel tired during the day  Wake up fresh  During the work up has CT chest that shows RUL nodule 1.6 along with other small less than 4 mm nodules  PET scan shows uptake 8 SUV   No mediastinal lesions       He underwent PET scan and was + then he underwent PFT and deemed deangelo\giablke for resection RUL           ALLERGIES:    Allergies   Allergen Reactions    Augmentin [Amoxicillin-Pot Clavulanate]     Ciprofloxacin      Fever         PAST MEDICAL HISTORY:       Diagnosis Date    Hypertension     unstable blood pressure       MEDICATIONS:   No current facility-administered medications for this encounter. Current Outpatient Medications   Medication Sig Dispense Refill    HYDROcodone-acetaminophen (NORCO) 5-325 MG per tablet Take 1 tablet by mouth every 4 hours as needed for Pain for up to 5 days. Intended supply: 5 days.  Take lowest dose possible to manage pain 30 tablet 0    amiodarone (CORDARONE) 200 MG tablet Take 1 tablet by mouth daily for 7 days 7 tablet 0    docusate sodium (COLACE) 100 MG capsule Take 1 capsule by mouth 2 times daily as needed for Constipation (constipation) 20 capsule 0    amLODIPine (NORVASC) 5 MG tablet Take 5 mg by mouth daily      ALPRAZolam (XANAX) 0.5 MG tablet Take 1 tablet by mouth daily as needed.  Cholecalciferol (VITAMIN D3 PO) Take 1 capsule by mouth daily      Multiple Vitamins-Minerals (MULTIVITAMIN ADULT PO) Take 1 tablet by mouth daily      atorvastatin (LIPITOR) 10 MG tablet Take 10 mg by mouth daily      pantoprazole (PROTONIX) 20 MG tablet Take 20 mg by mouth daily         SOCIAL AND OCCUPATIONAL HEALTH:  Social History     Tobacco Use   Smoking Status Former Smoker    Years: 20.00    Types: Cigarettes   Smokeless Tobacco Never Used         SURGICAL HISTORY:   Past Surgical History:   Procedure Laterality Date    COLONOSCOPY      POLYP    ENDOSCOPY, COLON, DIAGNOSTIC         FAMILY HISTORY:   Lung cancer:No  DVT or PE No     REVIEW OF SYSTEMS:  Constitutional: General health is good . There has been no weight changes. No fevers, fatigue or weakness. Head: Patient denies any history of trauma, convulsive disorder or syncope. Skin:  Patient denies history of changes in pigmentation, eruptions or pruritus. No easy bruising or bleeding. EENT: no nasal congestion   Cardiovascular ,No chest pain ,No edema ,  Respiration:SOB: + ,THURSTON :+  Gastrointestinal:No GI bleed ,no melena  ,no hematemesis    Musculoskeletal: no joint pain ,no swelling  Neurological:no , syncope. Denies twitching, convulsions, loss of consciousness or memory. Endocrine:  . No history of goiter, exophthalmos or dryness of skin. The patient has no history of diabetes. Hematopoietic:  No history of bleeding disorders or easy bruising. Rheumatic:  No connective tissue disease history or polyarthritis/inflammatory joint disease.       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 I will keep following with you ,should you have any concerns ,please contact me at [phone]     Sincerely,        Kristofer Pelaez MD  Pulmonary & Critical Care Medicine     NOTE: This report was transcribed using voice recognition software. Every effort was made to ensure accuracy; however, inadvertent computerized transcription errors may be present.

## 2020-08-25 NOTE — DISCHARGE SUMMARY
Physician Discharge Summary     Patient ID:  Juancho Morgan  [id number]  50 y.o.  1955    Admit date: 8/21/2020    Discharge date and time: 8/22/2020  1:08 PM     Admitting Physician: Malvin Garcia MD     Discharge Physician: same    Admission Diagnoses: Nodule of right lung [R91.1]    Discharge Diagnoses:  Right upper lobe lung nodule, adenocarcinoma    OPERATIONS PERFORMED:  1. Right robotic VATS. 2.  Robotic lysis of adhesions. 3.  Robotic right upper lobe wedge resection. 4.  Robotic right upper lobectomy. 5.  Mediastinal lymph node dissection. 6.  Intercostal nerve block. Admission Condition: good    Discharged Condition: good    Indication for Admission: This is a 71-year-old man, who had a smoking history, who was  found to have bilateral lung nodules, the one in the left was only about  4 mm, but the other one in the right was about 1.6 cm. PET scan was  done and it lit up in his right upper lobe nodule and nowhere else. PFTs were adequate for resection and he was referred for resection    Hospital Course: On 8/21/2020 patient underwent right robotic VATS, RU lobectomy by Dr. Adrian Thompson. The patient tolerated the operation well and was transferred to CVICU in stable condition. The patient was extubated postoperatively. POD 1 chest tube was removed and he went home.     Consults: pulmonary/intensive care    Discharge Exam:  Vitals   Vitals:     08/21/20 2000 08/21/20 2355 08/22/20 0405 08/22/20 0800   BP:   129/60 125/60 (!) 148/70   Pulse: 90 93 91 107   Resp:   20 20 20   Temp:   98.5 °F (36.9 °C) 98.2 °F (36.8 °C)     TempSrc:   Temporal       SpO2:   98%   96%   Weight:           Height:                O2: RA L/NC       Intake/Output Summary (Last 24 hours) at 8/22/2020 0903  Last data filed at 8/22/2020 6813      Gross per 24 hour   Intake 1678 ml   Output 1285 ml   Net 393 ml             Recent Labs     08/21/20  1220 08/22/20  0724   WBC 12.6* 19.1*   HGB 14.5 14.1   HCT 41.7 40.6   PLT 123* 146           Recent Labs     08/21/20  1220 08/22/20  0724   BUN 20 19   CREATININE 1.2 1.0           PE  Cardiac: RRR  Lungs: ctab  Chest incisions C/D/I, approximated, no erythema. Chest tube in place, no leak  Abd: Soft, nontender, +BS  Ext: DURHAM      Disposition: home    Patient Instructions:    [facility]   Home Medication Instructions AFF:[id number]    Printed on:08/25/20 4372   Medication Information                      ALPRAZolam (XANAX) 0.5 MG tablet  Take 1 tablet by mouth daily as needed. amiodarone (CORDARONE) 200 MG tablet  Take 1 tablet by mouth daily for 7 days             amLODIPine (NORVASC) 5 MG tablet  Take 5 mg by mouth daily             atorvastatin (LIPITOR) 10 MG tablet  Take 10 mg by mouth daily             Cholecalciferol (VITAMIN D3 PO)  Take 1 capsule by mouth daily             docusate sodium (COLACE) 100 MG capsule  Take 1 capsule by mouth 2 times daily as needed for Constipation (constipation)             HYDROcodone-acetaminophen (NORCO) 5-325 MG per tablet  Take 1 tablet by mouth every 4 hours as needed for Pain for up to 5 days. Intended supply: 5 days.  Take lowest dose possible to manage pain             Multiple Vitamins-Minerals (MULTIVITAMIN ADULT PO)  Take 1 tablet by mouth daily             pantoprazole (PROTONIX) 20 MG tablet  Take 20 mg by mouth daily               Activity: activity as tolerated  Diet: regular diet  Wound Care: keep wound clean and dry    Future Appointments   Date Time Provider Aleida Patel   9/8/2020 11:00 AM Aviva Bro MD CARDIO SURG [facility]         Smoking cessation education provided prior to discharge    Signed:  Patrick Tejada PA-C

## 2020-08-26 ENCOUNTER — TELEPHONE (OUTPATIENT)
Dept: PULMONOLOGY | Age: 65
End: 2020-08-26

## 2020-08-26 ENCOUNTER — TELEPHONE (OUTPATIENT)
Dept: CARDIOTHORACIC SURGERY | Age: 65
End: 2020-08-26

## 2020-08-26 NOTE — TELEPHONE ENCOUNTER
Pt concerned he has coughed up some some bright red blood, multiple times not a significant amount. Also he has had a steady cough with a hoarse throat. Can he take anything for this?  Please advise

## 2020-08-26 NOTE — TELEPHONE ENCOUNTER
Return call to pt to advise that RN has spoken to Dr. Selena Horton re: hospital follow up as requested by pt in VM earlier today. Advised that Dr. Selena Horton would like to have a virtual visit with pt next week and have Chest CT repeated in 3 mos. Advised pt that we have him scheduled for office call next Wed 9/2/20 and that office will be scheduling the Chest CT to be done in November per Dr. Vinicio Ramírez's orders. Pt agrees and office to mail out appt letter. Pt declined need for Office visit appt card to be mailed.

## 2020-08-27 NOTE — TELEPHONE ENCOUNTER
Pt did speak to pulmonology today. They suggested a throat lozenge. He is now concerned about a flutter in his heart that he notices after coughing. He is requesting a PA to call him.

## 2020-08-27 NOTE — TELEPHONE ENCOUNTER
Please advise patient to call their pulmonologist, if more bright red blood with cough please advise patient to go to ER.

## 2020-08-27 NOTE — TELEPHONE ENCOUNTER
Patient called all questions answered  No fluttering at present  Patient states flutter feeling when coughing  Advised to follow up with PCP tomm if able for check and poss EKG  If any chest discomfort or frequent fluttering advised to go to ER  Amina siddiqui

## 2020-09-02 ENCOUNTER — HOSPITAL ENCOUNTER (OUTPATIENT)
Age: 65
Discharge: HOME | End: 2020-09-04
Payer: COMMERCIAL

## 2020-09-02 ENCOUNTER — HOSPITAL ENCOUNTER (OUTPATIENT)
Dept: GENERAL RADIOLOGY | Age: 65
Discharge: HOME | End: 2020-09-04
Payer: COMMERCIAL

## 2020-09-02 ENCOUNTER — VIRTUAL VISIT (OUTPATIENT)
Dept: PULMONOLOGY | Age: 65
End: 2020-09-02
Payer: COMMERCIAL

## 2020-09-02 PROCEDURE — 71046 X-RAY EXAM CHEST 2 VIEWS: CPT

## 2020-09-02 PROCEDURE — 99442 PR PHYS/QHP TELEPHONE EVALUATION 11-20 MIN: CPT | Performed by: INTERNAL MEDICINE

## 2020-09-02 NOTE — PROGRESS NOTES
Department of Internal Medicine  Division of Pulmonary, Critical Care & Sleep Medicine  Pulmonary [address] [facility]                                             Pulmonary Clinic Consult     I had the pleasure of seeing  Arleen Cameron in the [address] [facility] regarding their Lung nodule       Follow up post surgery and RUL resecetion     HISTORY OF PRESENT ILLNESS:    Arleen Cameron is a 60y.o. year old  Who smoke for more than 30-35 years and has about 50 PPY smoking and work in [address] [place] [address]   The Patient comes in with SOB and mid chest tightness at times and epithat has been going on for the last few  Associated with mild cough . ,He  states that it get worse with exercise or walking long distance and he can walk 1 block And go 1-2 flight of stairs before get short winded    No weight lossSleep history :  Snoring   Feel tired during the day  Wake up fresh  During the work up has CT chest that shows RUL nodule 1.6 along with other small less than 4 mm nodules  PET scan shows uptake 8 SUV   No mediastinal lesions     Today visit     He under went RUL resection (Lobectomy ) start to have rib pain yesterday with no chest pain and no fever or chills and has some cough               ALLERGIES:    Allergies   Allergen Reactions    Augmentin [Amoxicillin-Pot Clavulanate]     Ciprofloxacin      Fever         PAST MEDICAL HISTORY:       Diagnosis Date    Hypertension     unstable blood pressure       MEDICATIONS:   Current Outpatient Medications   Medication Sig Dispense Refill    amiodarone (CORDARONE) 200 MG tablet Take 1 tablet by mouth daily for 7 days 7 tablet 0    docusate sodium (COLACE) 100 MG capsule Take 1 capsule by mouth 2 times daily as needed for Constipation (constipation) 20 capsule 0    amLODIPine (NORVASC) 5 MG tablet Take 5 mg by mouth daily      ALPRAZolam (XANAX) 0.5 MG tablet Take 1 tablet by mouth daily as needed.       Cholecalciferol (VITAMIN D3 PO) Take 1 capsule by mouth daily      Multiple Vitamins-Minerals (MULTIVITAMIN ADULT PO) Take 1 tablet by mouth daily      atorvastatin (LIPITOR) 10 MG tablet Take 10 mg by mouth daily      pantoprazole (PROTONIX) 20 MG tablet Take 20 mg by mouth daily       No current facility-administered medications for this visit. SOCIAL AND OCCUPATIONAL HEALTH:  Social History     Tobacco Use   Smoking Status Former Smoker    Years: 20.00    Types: Cigarettes   Smokeless Tobacco Never Used         SURGICAL HISTORY:   Past Surgical History:   Procedure Laterality Date    COLONOSCOPY      POLYP    ENDOSCOPY, COLON, DIAGNOSTIC      THORACOSCOPY N/A 8/21/2020    BRONCHOSCOPY RIGHT THORACOSCOPY ROBOTIC VIDEO ASSISTED XI WITH UPPER LOBE WEDGE, POSS. LOBECTOMY performed by Caterina Cheung MD at [facility] HISTORY:   Lung cancer:No  DVT or PE No     REVIEW OF SYSTEMS:  Constitutional: General health is good . There has been no weight changes. No fevers, fatigue or weakness. Head: Patient denies any history of trauma, convulsive disorder or syncope. Skin:  Patient denies history of changes in pigmentation, eruptions or pruritus. No easy bruising or bleeding. EENT: no nasal congestion   Cardiovascular ,No chest pain ,No edema ,  Respiration:SOB: + ,THURSTON :+  Gastrointestinal:No GI bleed ,no melena  ,no hematemesis    Musculoskeletal: no joint pain ,no swelling  Neurological:no , syncope. Denies twitching, convulsions, loss of consciousness or memory. Endocrine:  . No history of goiter, exophthalmos or dryness of skin. The patient has no history of diabetes. Hematopoietic:  No history of bleeding disorders or easy bruising. Rheumatic:  No connective tissue disease history or polyarthritis/inflammatory joint disease. PHYSICAL EXAMINATION:  There were no vitals filed for this visit.    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 I will keep following with you ,should you have any concerns ,please contact me at [phone]     Sincerely,        Romana Hong MD  Pulmonary & Critical Care Medicine     NOTE: This report was transcribed using voice recognition software. Every effort was made to ensure accuracy; however, inadvertent computerized transcription errors may be present.

## 2020-09-02 NOTE — PROGRESS NOTES
Department of Internal Medicine  Division of Pulmonary, Critical Care & Sleep Medicine  Pulmonary [address] [facility]                                             Pulmonary Clinic Consult     I had the pleasure of seeing  Jacki Woody in the [address] [facility] regarding their Lung nodule       No chief complaint on file. HISTORY OF PRESENT ILLNESS:    Jacki Woody is a 60y.o. year old  Who smoke for more than 30-35 years and has about 50 PPY smoking and work in [address] [place] [address]   The Patient comes in with SOB and mid chest tightness at times and epithat has been going on for the last few  Associated with mild cough . ,He  states that it get worse with exercise or walking long distance and he can walk 1 block And go 1-2 flight of stairs before get short winded    No weight lossSleep history :  Snoring   Feel tired during the day  Wake up fresh  During the work up has CT chest that shows RUL nodule 1.6 along with other small less than 4 mm nodules  PET scan shows uptake 8 SUV   No mediastinal lesions                 ALLERGIES:    Allergies   Allergen Reactions    Augmentin [Amoxicillin-Pot Clavulanate]     Ciprofloxacin      Fever         PAST MEDICAL HISTORY:       Diagnosis Date    Hypertension     unstable blood pressure       MEDICATIONS:   Current Outpatient Medications   Medication Sig Dispense Refill    amiodarone (CORDARONE) 200 MG tablet Take 1 tablet by mouth daily for 7 days 7 tablet 0    docusate sodium (COLACE) 100 MG capsule Take 1 capsule by mouth 2 times daily as needed for Constipation (constipation) 20 capsule 0    amLODIPine (NORVASC) 5 MG tablet Take 5 mg by mouth daily      ALPRAZolam (XANAX) 0.5 MG tablet Take 1 tablet by mouth daily as needed.       Cholecalciferol (VITAMIN D3 PO) Take 1 capsule by mouth daily      Multiple Vitamins-Minerals (MULTIVITAMIN ADULT PO) Take 1 tablet by mouth daily      atorvastatin (LIPITOR) 10 MG tablet Take ,In my mind this is cancer until proven otherwise and even if biopsy come negative   -lesion seems peripheral  And less than 3 cm and I am not sure he need EBUS unless Dr Gayle Mcelroy resuested  -Full PFT   _ work up for epigastric pain gastroesophageal reflux disease vs cardiac as per his PCP         Thank you for allowing me to participate in [facility]. I will keep following with you ,should you have any concerns ,please contact me at [phone]     Sincerely,        Thanh Gutierrez MD  Pulmonary & Critical Care Medicine     NOTE: This report was transcribed using voice recognition software. Every effort was made to ensure accuracy; however, inadvertent computerized transcription errors may be present.

## 2020-09-02 NOTE — PROGRESS NOTES
TeleMedicine Video Visit    This visit was performed as a virtual video visit using a telephone. Patient identification was verified at the start of the visit, including the patient's telephone number and physical location. I discussed with the patient the nature of our telehealth visits, that:     1. Due to the nature of an audio- video modality, the only components of a physical exam that could be done are the elements supported by direct observation. 2. I would evaluate the patient and recommend diagnostics and treatments based on my assessment. 3. If it was felt that the patient should be evaluated in clinic or an emergency room setting, then they would be directed there. 4. Our sessions are not being recorded and that personal health information is protected. 5. Our team would provide follow up care in person if/when the patient needs it. Patient does agree to proceed with telemedicine consultation. Patient's location: Pt's home  Physician  location [address] 7 other people involved in call N/A      Time spent: Greater than 20    This visit was completed virtually using telephone. Surgical/Hospital follow up call today with pt. Post VATS 8/21. Plan for Chest CT to be done in 3 mos; office to schedule. Pt reports increased cough and some discomfort in chest/incison area. Plan for CXR to be done today; Pt requested CT scan to be scheduled at [facility]; office to try to reschedule. Pt to follow up with Dr. Boo and decide on oncology referral. Pt will call Office to decide on referral for oncology. Follow up as needed.

## 2020-09-04 ENCOUNTER — TELEPHONE (OUTPATIENT)
Dept: PULMONOLOGY | Age: 65
End: 2020-09-04

## 2020-09-04 NOTE — TELEPHONE ENCOUNTER
Call back to pt after getting call from Radiology at [facility] that pt wanted copy of CXR results faxed to him because he had concerns. Dr. Lacho Herrera reviewed results and stated there was nothing in the CXR that would indicate a cause for the pain he is having. Pt was to be advised that he can seek evaluation in the ER for the pain and was encouraged to follow up with Dr. Gracie Jenkins as pain may be associated with surgical procedure. Pt advised via phone call and stated he understood.

## 2020-09-05 NOTE — TELEPHONE ENCOUNTER
I discuss with Rita Woodall showing right side atelectasis but nothing to explain his rib pain or cough or chest pain  He does 1500 in Spirometry. I recommended that he go ER and have CTA chest and lab work up tp r/o PE as I am not sure what is causing his symptoms .     He also has follow up with DR Wanda Echeverria   All questions answered and he express understanding and that he will go ER

## 2020-09-08 ENCOUNTER — OFFICE VISIT (OUTPATIENT)
Dept: PULMONOLOGY | Age: 65
End: 2020-09-08
Payer: COMMERCIAL

## 2020-09-08 ENCOUNTER — OFFICE VISIT (OUTPATIENT)
Dept: CARDIOTHORACIC SURGERY | Age: 65
End: 2020-09-08

## 2020-09-08 VITALS
BODY MASS INDEX: 29.03 KG/M2 | HEART RATE: 88 BPM | HEIGHT: 67 IN | SYSTOLIC BLOOD PRESSURE: 133 MMHG | RESPIRATION RATE: 16 BRPM | DIASTOLIC BLOOD PRESSURE: 73 MMHG | WEIGHT: 185 LBS

## 2020-09-08 PROCEDURE — 99214 OFFICE O/P EST MOD 30 MIN: CPT | Performed by: INTERNAL MEDICINE

## 2020-09-08 PROCEDURE — 99024 POSTOP FOLLOW-UP VISIT: CPT | Performed by: THORACIC SURGERY (CARDIOTHORACIC VASCULAR SURGERY)

## 2020-09-08 RX ORDER — AZITHROMYCIN 250 MG/1
250 TABLET, FILM COATED ORAL DAILY
Qty: 5 TABLET | Refills: 0 | Status: SHIPPED | OUTPATIENT
Start: 2020-09-08 | End: 2020-09-13

## 2020-09-08 RX ORDER — DILTIAZEM HYDROCHLORIDE 60 MG/1
2 TABLET, FILM COATED ORAL DAILY
Qty: 1 INHALER | Refills: 3 | COMMUNITY
Start: 2020-09-08

## 2020-09-08 RX ORDER — PREDNISONE 10 MG/1
TABLET ORAL
Qty: 10 TABLET | Refills: 0 | Status: SHIPPED | OUTPATIENT
Start: 2020-09-08 | End: 2020-09-18

## 2020-09-08 RX ORDER — ALBUTEROL SULFATE 90 UG/1
2 AEROSOL, METERED RESPIRATORY (INHALATION) EVERY 4 HOURS PRN
Qty: 1 INHALER | Refills: 6 | Status: SHIPPED
Start: 2020-09-08 | End: 2021-08-02 | Stop reason: SDUPTHER

## 2020-09-08 ASSESSMENT — ENCOUNTER SYMPTOMS
WHEEZING: 0
SHORTNESS OF BREATH: 0
COUGH: 0

## 2020-09-08 NOTE — PROGRESS NOTES
Department of Internal Medicine  Division of Pulmonary, Critical Care & Sleep Medicine  Pulmonary [address] [facility]                                             Pulmonary Clinic Consult     I had the pleasure of seeing  Ricardo Torres in the [address] [facility] regarding their Lung nodule       Chief Complaint   Patient presents with    Follow-up     recent scan       HISTORY OF PRESENT ILLNESS:    Ricardo Torres is a 60y.o. year old  Who smoke for more than 30-35 years and has about 50 PPY smoking and work in [address] [place] [address]   The Patient comes in with SOB and mid chest tightness at times and epithat has been going on for the last few  Associated with mild cough . ,He  states that it get worse with exercise or walking long distance and he can walk 1 block And go 1-2 flight of stairs before get short winded    No weight lossSleep history :  Snoring   Feel tired during the day  Wake up fresh  During the work up has CT chest that shows RUL nodule 1.6 along with other small less than 4 mm nodules  PET scan shows uptake 8 SUV   No mediastinal lesions       Today Visit   He still with some cough but no SOB and no hemoptysis and no chest pain   Mild trouble breathing at night and early morning   Ambulated in office and sat 98   S/p RUL lobectomy       ALLERGIES:    Allergies   Allergen Reactions    Augmentin [Amoxicillin-Pot Clavulanate]     Ciprofloxacin      Fever         PAST MEDICAL HISTORY:       Diagnosis Date    Hypertension     unstable blood pressure       MEDICATIONS:   Current Outpatient Medications   Medication Sig Dispense Refill    predniSONE (DELTASONE) 10 MG tablet Take 20 mg Po X 5 days 10 tablet 0    azithromycin (ZITHROMAX) 250 MG tablet Take 1 tablet by mouth daily for 5 days 5 tablet 0    albuterol sulfate HFA (PROAIR HFA) 108 (90 Base) MCG/ACT inhaler Inhale 2 puffs into the lungs every 4 hours as needed for Wheezing or Shortness of Breath 1 Inhaler 6  amiodarone (CORDARONE) 200 MG tablet Take 1 tablet by mouth daily for 7 days 7 tablet 0    docusate sodium (COLACE) 100 MG capsule Take 1 capsule by mouth 2 times daily as needed for Constipation (constipation) 20 capsule 0    amLODIPine (NORVASC) 5 MG tablet Take 5 mg by mouth daily      ALPRAZolam (XANAX) 0.5 MG tablet Take 1 tablet by mouth daily as needed.  Cholecalciferol (VITAMIN D3 PO) Take 1 capsule by mouth daily      Multiple Vitamins-Minerals (MULTIVITAMIN ADULT PO) Take 1 tablet by mouth daily      atorvastatin (LIPITOR) 10 MG tablet Take 10 mg by mouth daily      pantoprazole (PROTONIX) 20 MG tablet Take 20 mg by mouth daily       No current facility-administered medications for this visit. SOCIAL AND OCCUPATIONAL HEALTH:  Social History     Tobacco Use   Smoking Status Former Smoker    Years: 20.00    Types: Cigarettes   Smokeless Tobacco Never Used         SURGICAL HISTORY:   Past Surgical History:   Procedure Laterality Date    COLONOSCOPY      POLYP    ENDOSCOPY, COLON, DIAGNOSTIC      THORACOSCOPY N/A 8/21/2020    BRONCHOSCOPY RIGHT THORACOSCOPY ROBOTIC VIDEO ASSISTED XI WITH UPPER LOBE WEDGE, POSS. LOBECTOMY performed by Krystal Petersen MD at [facility] HISTORY:   Lung cancer:No  DVT or PE No     REVIEW OF SYSTEMS:  Constitutional: General health is good . There has been no weight changes. No fevers, fatigue or weakness. Head: Patient denies any history of trauma, convulsive disorder or syncope. Skin:  Patient denies history of changes in pigmentation, eruptions or pruritus. No easy bruising or bleeding. EENT: no nasal congestion   Cardiovascular ,No chest pain ,No edema ,  Respiration:SOB: + ,THURSTON :+  Gastrointestinal:No GI bleed ,no melena  ,no hematemesis    Musculoskeletal: no joint pain ,no swelling  Neurological:no , syncope. Denies twitching, convulsions, loss of consciousness or memory. Endocrine:  .  No history of goiter, exophthalmos or dryness of skin. The patient has no history of diabetes. Hematopoietic:  No history of bleeding disorders or easy bruising. Rheumatic:  No connective tissue disease history or polyarthritis/inflammatory joint disease. PHYSICAL EXAMINATION:  There were no vitals filed for this visit. CVS s1 ,s2  Chest rhonchi RLL   Abdomen soft  CNS no deficit     DATA:   PFt ordered   CXR reviewed  Reviewed also all CT and CXR and show him other nodules     IMPRESSION:    1-Adenocarcinoma Lung nodule 1.6   2-Possible COPD   3-Lung nodules ,less than 6 ,scattred b/1                PLAN:      Patient work in [address] [place] [address] and get exposed to industrial material which can worsen his lung function and put him at risk for interstitial  lung disease related to his exposure ,he was advised to use complete precaution to prevent exposure as much as possible   -To see Dr Dipika Womack today   -trial of steroids ,azithromycin for cough and albuterol as needed and Symbicort 80/4.5 bid   _Dr mccormack did not feel EBUS needed. lymph nodes were negative   -refer to Dr Uri Phillips review . most likely atelectasis     _ work up for epigastric pain gastroesophageal reflux disease vs cardiac as per his PCP         Thank you for allowing me to participate in [facility]. I will keep following with you ,should you have any concerns ,please contact me at [phone]     Sincerely,        Dinesh Mccoy MD  Pulmonary & Critical Care Medicine     NOTE: This report was transcribed using voice recognition software. Every effort was made to ensure accuracy; however, inadvertent computerized transcription errors may be present.

## 2020-09-08 NOTE — PROGRESS NOTES
Subjective:      Chief Complaint   Patient presents with    Post-Op Check     Robotic RUL wedge       Patient ID: Asiya Sliva is a 60 y.o. male who presents to office for routine follow up s/p Right robotic vats, RUL wedge resection, RULobectomy on 8/21/2020. Patient states he is doing well and denies any CP, SOB or incisional problems      CANCER CASE SUMMARY: Procedure: Wedge resection with completion lobectomy   Specimen laterality: Right   Tumor site: Upper lobe   Tumor size: 1.2 cm in greatest dimension   Additional dimensions: 1.2 x 1.0 cm   Size of invasive component: 1.0 cm   Tumor focality: Single focus   Histologic type: Invasive adenocarcinoma, lepidic predominant (80%)     Other subtypes present: Papillary (15%), acinar (5%)   Histologic grade: G1/well differentiated   Spread through airspaces: Not identified   Visceral pleural invasion: Present   Lymphovascular invasion: Not identified   Direct invasion of adjacent structures: No adjacent structures present   Margins: All margins are uninvolved by tumor   Margins examined: Bronchial and vascular   Distance of invasive carcinoma from closest margin: Cannot be determined   with certainty, specimen removed in 2 pieces. Regional lymph nodes: 4 of 4 lymph nodes negative for involvement by   carcinoma   Treatment effect: No known presurgical therapy   Pathologic stage classification (pTNM, AJCC 8th edition): pT2 (tumor   directly involves visceral pleura), pN0     HPI    Review of Systems   Constitutional: Negative for chills and fever. Respiratory: Negative for cough, shortness of breath and wheezing. Cardiovascular: Negative for chest pain. Objective:   Physical Exam  Constitutional:       Appearance: Normal appearance. Neck:      Musculoskeletal: Normal range of motion and neck supple. Cardiovascular:      Rate and Rhythm: Normal rate and regular rhythm. Pulses: Normal pulses. Heart sounds: Normal heart sounds.

## 2020-09-08 NOTE — PROGRESS NOTES
Department of Internal Medicine  Division of Pulmonary, Critical Care & Sleep Medicine  Pulmonary [address] [facility]                                             Pulmonary Clinic Consult     I had the pleasure of seeing  Sadi Ortega in the [address] [facility] regarding their Lung nodule       Follow up post surgery and RUL resecetion     HISTORY OF PRESENT ILLNESS:    Sadi Ortega is a 60y.o. year old  Who smoke for more than 30-35 years and has about 50 PPY smoking and work in [address] [place] [address]   The Patient comes in with SOB and mid chest tightness at times and epithat has been going on for the last few  Associated with mild cough . ,He  states that it get worse with exercise or walking long distance and he can walk 1 block And go 1-2 flight of stairs before get short winded    No weight lossSleep history :  Snoring   Feel tired during the day  Wake up fresh  During the work up has CT chest that shows RUL nodule 1.6 along with other small less than 4 mm nodules  PET scan shows uptake 8 SUV   No mediastinal lesions     Today visit     He under went RUL resection (Lobectomy ) start to have rib pain yesterday with no chest pain and no fever or chills and has some cough               ALLERGIES:    Allergies   Allergen Reactions    Augmentin [Amoxicillin-Pot Clavulanate]     Ciprofloxacin      Fever         PAST MEDICAL HISTORY:       Diagnosis Date    Hypertension     unstable blood pressure       MEDICATIONS:   Current Outpatient Medications   Medication Sig Dispense Refill    amiodarone (CORDARONE) 200 MG tablet Take 1 tablet by mouth daily for 7 days 7 tablet 0    docusate sodium (COLACE) 100 MG capsule Take 1 capsule by mouth 2 times daily as needed for Constipation (constipation) 20 capsule 0    amLODIPine (NORVASC) 5 MG tablet Take 5 mg by mouth daily      ALPRAZolam (XANAX) 0.5 MG tablet Take 1 tablet by mouth daily as needed.       Cholecalciferol (VITAMIN D3 PO) Take 1 capsule by mouth daily      Multiple Vitamins-Minerals (MULTIVITAMIN ADULT PO) Take 1 tablet by mouth daily      atorvastatin (LIPITOR) 10 MG tablet Take 10 mg by mouth daily      pantoprazole (PROTONIX) 20 MG tablet Take 20 mg by mouth daily       No current facility-administered medications for this visit. SOCIAL AND OCCUPATIONAL HEALTH:  Social History     Tobacco Use   Smoking Status Former Smoker    Years: 20.00    Types: Cigarettes   Smokeless Tobacco Never Used         SURGICAL HISTORY:   Past Surgical History:   Procedure Laterality Date    COLONOSCOPY      POLYP    ENDOSCOPY, COLON, DIAGNOSTIC      THORACOSCOPY N/A 8/21/2020    BRONCHOSCOPY RIGHT THORACOSCOPY ROBOTIC VIDEO ASSISTED XI WITH UPPER LOBE WEDGE, POSS. LOBECTOMY performed by Adonis Salinas MD at [facility] HISTORY:   Lung cancer:No  DVT or PE No     REVIEW OF SYSTEMS:  Constitutional: General health is good . There has been no weight changes. No fevers, fatigue or weakness. Head: Patient denies any history of trauma, convulsive disorder or syncope. Skin:  Patient denies history of changes in pigmentation, eruptions or pruritus. No easy bruising or bleeding. EENT: no nasal congestion   Cardiovascular ,No chest pain ,No edema ,  Respiration:SOB: + ,THURSTON :+  Gastrointestinal:No GI bleed ,no melena  ,no hematemesis    Musculoskeletal: no joint pain ,no swelling  Neurological:no , syncope. Denies twitching, convulsions, loss of consciousness or memory. Endocrine:  . No history of goiter, exophthalmos or dryness of skin. The patient has no history of diabetes. Hematopoietic:  No history of bleeding disorders or easy bruising. Rheumatic:  No connective tissue disease history or polyarthritis/inflammatory joint disease. PHYSICAL EXAMINATION:  There were no vitals filed for this visit.    This phone visit     DATA:   PFt ordered     IMPRESSION:    1-RUL lung cancer s/p lobectomy   2- rib pain

## 2020-09-10 PROBLEM — C34.11 MALIGNANT NEOPLASM OF UPPER LOBE OF RIGHT LUNG (HCC): Status: ACTIVE | Noted: 2020-09-10

## 2020-09-24 ENCOUNTER — TELEPHONE (OUTPATIENT)
Dept: CASE MANAGEMENT | Age: 65
End: 2020-09-24

## 2020-09-24 ENCOUNTER — OFFICE VISIT (OUTPATIENT)
Dept: ONCOLOGY | Age: 65
End: 2020-09-24
Payer: COMMERCIAL

## 2020-09-24 ENCOUNTER — HOSPITAL ENCOUNTER (OUTPATIENT)
Dept: INFUSION THERAPY | Age: 65
Discharge: HOME | End: 2020-09-24
Payer: COMMERCIAL

## 2020-09-24 VITALS
DIASTOLIC BLOOD PRESSURE: 82 MMHG | SYSTOLIC BLOOD PRESSURE: 149 MMHG | TEMPERATURE: 98.7 F | HEIGHT: 67 IN | WEIGHT: 184.3 LBS | HEART RATE: 94 BPM | BODY MASS INDEX: 28.93 KG/M2 | OXYGEN SATURATION: 97 %

## 2020-09-24 PROCEDURE — 99245 OFF/OP CONSLTJ NEW/EST HI 55: CPT | Performed by: INTERNAL MEDICINE

## 2020-09-24 PROCEDURE — 99214 OFFICE O/P EST MOD 30 MIN: CPT

## 2020-09-24 NOTE — TELEPHONE ENCOUNTER
Met with patient and wife Austin Villarreal during his  initial consultation with Dr. Don Palafox for his  recent Lung Adenocarcinoma cancer diagnosis. Introduced myself and explained my role with patients receiving treatment at our center. Patient was friendly and receptive. He states he is doing ok. He still has some pain since the surgery. He states he has lost about 10lbs since surgery and feels his appetite is ok. Instructed in detail on his pathology findings including cancer type. Instructed on next steps including CT scan of chest and MRI of the brain per Dr. Larissa Crawford recommendations and follow up care. Provided with  literature Patient Resource Lung Cancer, Understanding Lung Cancer and Nutrition Tips and Lung Cancer. Reviewed resources available including Social Work, Dietician and Financial Navigator. Patient denies any needs at this time. Provided with my contact information and instructed patient to call me with questions or concerns. Verbalizes understanding. Patient appreciative of visit. Will continue to follow.

## 2020-09-24 NOTE — PROGRESS NOTES
Hima Liu  1955 60 y.o. Referring Physician: Dr Lulu Ochoa    PCP: Juanito Antoine MD    Vitals:    09/24/20 1417   BP: (!) 149/82   Pulse: 94   Temp: 98.7 °F (37.1 °C)   SpO2: 97%        Wt Readings from Last 3 Encounters:   09/24/20 184 lb 4.8 oz (83.6 kg)   09/08/20 185 lb (83.9 kg)   08/21/20 192 lb (87.1 kg)        Body mass index is 28.87 kg/m². Chief Complaint:   Chief Complaint   Patient presents with   [facility] Patient         Cancer Staging  Malignant neoplasm of upper lobe of right lung [facility])  Staging form: Lung, AJCC 8th Edition  - Clinical stage from 9/10/2020: Stage IA1 (cT1a, cN0, cM0) - Signed by Lynda Jensen MD on 9/10/2020  - Pathologic stage from 9/10/2020: Stage IB (pT2a, pN0, cM0) - Signed by Lynda Jensen MD on 9/10/2020      Prior Radiation Therapy? NO    Concurrent Chemo/radiation? NO    Prior Chemotherapy? NO    Prior Hormonal Therapy? NO    Head and Neck Cancer? No, patient does NOT have HN cancer. Current Outpatient Medications:     albuterol sulfate HFA (PROAIR HFA) 108 (90 Base) MCG/ACT inhaler, Inhale 2 puffs into the lungs every 4 hours as needed for Wheezing or Shortness of Breath, Disp: 1 Inhaler, Rfl: 6    amLODIPine (NORVASC) 5 MG tablet, Take 5 mg by mouth daily, Disp: , Rfl:     atorvastatin (LIPITOR) 10 MG tablet, Take 10 mg by mouth daily, Disp: , Rfl:     SYMBICORT 80-4.5 MCG/ACT AERO, Inhale 2 puffs into the lungs daily (Patient not taking: Reported on 9/24/2020), Disp: 1 Inhaler, Rfl: 3    docusate sodium (COLACE) 100 MG capsule, Take 1 capsule by mouth 2 times daily as needed for Constipation (constipation) (Patient not taking: Reported on 9/24/2020), Disp: 20 capsule, Rfl: 0    ALPRAZolam (XANAX) 0.5 MG tablet, Take 1 tablet by mouth daily as needed. , Disp: , Rfl:     Cholecalciferol (VITAMIN D3 PO), Take 1 capsule by mouth daily, Disp: , Rfl:     Multiple Vitamins-Minerals (MULTIVITAMIN ADULT PO), Take 1 tablet by mouth daily, Disp: , Rfl:     pantoprazole (PROTONIX) 20 MG tablet, Take 20 mg by mouth daily, Disp: , Rfl:        Past Medical History:   Diagnosis Date    Cancer ([facility] [address]     lung    Hyperlipidemia     managed    Hypertension     unstable blood pressure       Past Surgical History:   Procedure Laterality Date    COLONOSCOPY      POLYP    ENDOSCOPY, COLON, DIAGNOSTIC      THORACOSCOPY N/A 8/21/2020    BRONCHOSCOPY RIGHT THORACOSCOPY ROBOTIC VIDEO ASSISTED XI WITH UPPER LOBE WEDGE, POSS. LOBECTOMY performed by Titus Palm MD at [address] History   Problem Relation Age of Onset    Heart Disease Father     Alzheimer's Disease Father        Social History     Socioeconomic History    Marital status:      Spouse name: Not on file    Number of children: Not on file    Years of education: Not on file    Highest education level: Not on file   Occupational History    Not on file   Social Needs    Financial resource strain: Not on file    Food insecurity     Worry: Not on file     Inability: Not on file   [employer] needs     Medical: Not on file     Non-medical: Not on file   Tobacco Use    Smoking status: Former Smoker     Years: 20.00     Types: Cigarettes    Smokeless tobacco: Never Used   Substance and Sexual Activity    Alcohol use:  Yes     Alcohol/week: 0.0 standard drinks     Comment: few times per week    Drug use: No    Sexual activity: Not on file   Lifestyle    Physical activity     Days per week: Not on file     Minutes per session: Not on file    Stress: Not on file   Relationships    Social connections     Talks on phone: Not on file     Gets together: Not on file     Attends Church service: Not on file     Active member of club or organization: Not on file     Attends meetings of clubs or organizations: Not on file     Relationship status: Not on file    Intimate partner violence     Fear of current or ex partner: Not on file     Emotionally abused: Not on file confused/cognitively impaired patient  3. Call-light/bell within patient's reach  4. Chair/bed in low position, stretcher/bed with siderails up except when performing patient care activities  5. Educate patient/family/caregiver on falls prevention  6. Falls risk precaution (Yellow sticker Level II) placed on patient chart   7 or   Higher High Risk 1. Place patient in easily observable treatment room  2. Patient attended at all times by family member or staff  3. Provide assistance as indicated for ambulation activities  4. Reorient confused/cognitively impaired patient  5. Call-light/bell within patient's reach  6. Chair/bed in low position, stretcher/bed with siderails up except when performing patient care activities  7. Educate patient/family/caregiver on falls prevention  8. Falls risk precaution (Yellow sticker Level III) placed on patient chart           MALNUTRITION RISK SCREENING ASSESSMENT    Instructions:  Assess the patient and enter the appropriate indicators that are present for nutrition risk identification. Total the numbers entered and assign a risk score. Follow the appropriate action for total score listed below. Assessment   Date  9/24/2020     1. Have you lost weight without trying? 1- Yes, 0.5 kg to 5 kg     2. Have you been eating poorly because of a decreased appetite? 0- No   3. Do you have a diagnosis of head and neck cancer?       0- No                                                                                    TOTAL 1        Score of 0-1: No action  Score 2 or greater:  · For Non-Diabetic Patient: Recommend adding Ensure Enlive 2 x daily and provide patient with Ensure wellness bag with coupons  · For Diabetic Patient: Recommend adding Glucerna Shake 2 x daily and provide patient with Glucerna Wellness bag with coupons  · Route to the dietitian via [employer]    · Are you having  difficulty performing daily routine tasks  due

## 2020-09-24 NOTE — PROGRESS NOTES
Department of [facility] Oncology  Attending Consult Note    Reason for Visit: Consultation on a patient with Non Small Cell Lung Cancer    Referring Physician: Romana Hong, MD    PCP:  Juanito Antoine MD    History of Present Illness:  61 y/o male with hx of HTN, former smoker (50 pack/year), who was complaining of SOB and cough. CT chest 07/17/2020:  Scattered bilateral pulmonary nodules the largest being a solid nodule in RUL measuring 1.6 cm. PET/CT scan 07/29/2020:  Images reveal abnormal tracer uptake in the right lung at the level the pulmonary nodule peak SUV is 8.4  No convincing evidence of metastatic disease. Robotic right upper lobectomy mediastinal lymph node dissection on 8/21/2020  A.  Right upper lobe of lung, wedge resection: Invasive, well   differentiated adenocarcinoma, see cancer case summary. Carcinoma invades visceral pleura. The parenchymal margin is negative for involvement by malignancy. Intra-alveolar pigmented macrophages and mild emphysema. B.  Superior mediastinal lymph node, excision: Fragments of anthracotic   lymph node with sinus histiocytosis, negative for involvement by   carcinoma. Shirlean Bridegroom pulmonary ligament lymph node, excision: Anthracotic lymph   node with sinus histiocytosis, negative for involvement by carcinoma. D.  Subcarinal lymph node #1, excision: Fragments of anthracotic lymph   node with sinus histiocytosis, negative for involvement by carcinoma. E.  Subcarinal lymph node #2, excision: Fragments of anthracotic lymph   node with marked sinus histiocytosis, negative for involvement by   carcinoma. F.  Right upper lobe of lung, lobectomy: Patchy interstitial chronic   inflammation. Scattered intra-alveolar hemosiderin laden macrophages. Focal subpleural fibrosis. Negative for residual carcinoma. Bronchial and vascular margins are negative for involvement by carcinoma. CANCER CASE SUMMARY: Procedure:  Wedge History:      Procedure Laterality Date    COLONOSCOPY      POLYP    ENDOSCOPY, COLON, DIAGNOSTIC      THORACOSCOPY N/A 8/21/2020    BRONCHOSCOPY RIGHT THORACOSCOPY ROBOTIC VIDEO ASSISTED XI WITH UPPER LOBE WEDGE, POSS. LOBECTOMY performed by Nikkie Hart MD at [facility] OR     Family History:  Family History   Problem Relation Age of Onset    Heart Disease Father      Medications:  Reviewed and reconciled. Social History:  Social History     Socioeconomic History    Marital status:      Spouse name: Not on file    Number of children: Not on file    Years of education: Not on file    Highest education level: Not on file   Occupational History    Not on file   Social Needs    Financial resource strain: Not on file    Food insecurity     Worry: Not on file     Inability: Not on file   [employer] needs     Medical: Not on file     Non-medical: Not on file   Tobacco Use    Smoking status: Former Smoker     Years: 20.00     Types: Cigarettes    Smokeless tobacco: Never Used   Substance and Sexual Activity    Alcohol use: Yes     Alcohol/week: 0.0 standard drinks     Comment: few times per week    Drug use: No    Sexual activity: Not on file   Lifestyle    Physical activity     Days per week: Not on file     Minutes per session: Not on file    Stress: Not on file   Relationships    Social connections     Talks on phone: Not on file     Gets together: Not on file     Attends Anglican service: Not on file     Active member of club or organization: Not on file     Attends meetings of clubs or organizations: Not on file     Relationship status: Not on file    Intimate partner violence     Fear of current or ex partner: Not on file     Emotionally abused: Not on file     Physically abused: Not on file     Forced sexual activity: Not on file   Other Topics Concern    Not on file   Social History Narrative    Not on file     Allergies:   Allergies   Allergen Reactions    Augmentin [Amoxicillin-Pot Clavulanate]     Ciprofloxacin      Fever       Physical Exam:  BP (!) 149/82 (Site: Left Upper Arm, Position: Sitting, Cuff Size: Large Adult)   Pulse 94   Temp 98.7 °F (37.1 °C) (Temporal)   Ht 5' 7\" (1.702 m)   Wt 184 lb 4.8 oz (83.6 kg)   SpO2 97%   BMI 28.87 kg/m²   GENERAL: Alert, oriented x 3, not in acute distress. HEENT: PERRLA; EOMI. Oropharynx clear. NECK: Supple. Without lymphadenopathy. LUNGS: Good air entry bilaterally. No wheezing, crackles or ronchi. CARDIOVASCULAR: Regular rate. No murmurs, rubs or gallops. ABDOMEN: Soft. Non-tender, non-distended. Positive bowel sounds   EXTREMITIES: Without clubbing, cyanosis, or edema. NEUROLOGIC: No focal deficits. ECOG PS 1    Impression/Plan:  59 y/o male who underwent Robotic right upper lobectomy mediastinal lymph node dissection on 8/21/2020    A.  Right upper lobe of lung, wedge resection: Invasive, well differentiated adenocarcinoma, see cancer case summary. Carcinoma invades visceral pleura. The parenchymal margin is negative for involvement by malignancy. Intra-alveolar pigmented macrophages and mild emphysema. B.  Superior mediastinal lymph node, excision: Fragments of anthracotic lymph node with sinus histiocytosis, negative for involvement by carcinoma. Barabara Carmen pulmonary ligament lymph node, excision: Anthracotic lymph node with sinus histiocytosis, negative for involvement by carcinoma. D.  Subcarinal lymph node #1, excision: Fragments of anthracotic lymph node with sinus histiocytosis, negative for involvement by carcinoma. E.  Subcarinal lymph node #2, excision: Fragments of anthracotic lymph node with marked sinus histiocytosis, negative for involvement by carcinoma. F.  Right upper lobe of lung, lobectomy: Patchy interstitial chronic inflammation. Scattered intra-alveolar hemosiderin laden macrophages. Focal subpleural fibrosis. Negative for residual carcinoma.    Bronchial and vascular margins are negative for involvement by carcinoma. CANCER CASE SUMMARY: Procedure: Wedge resection with completion lobectomy   Specimen laterality: Right   Tumor site: Upper lobe   Tumor size: 1.2 cm in greatest dimension   Additional dimensions: 1.2 x 1.0 cm   Size of invasive component: 1.0 cm   Tumor focality: Single focus   Histologic type: Invasive adenocarcinoma, lepidic predominant (80%)     Other subtypes present: Papillary (15%), acinar (5%)   Histologic grade: G1/well differentiated   Spread through airspaces: Not identified   Visceral pleural invasion: Present   Lymphovascular invasion: Not identified   Direct invasion of adjacent structures: No adjacent structures present   Margins: All margins are uninvolved by tumor   Margins examined: Bronchial and vascular   Distance of invasive carcinoma from closest margin: Cannot be determined   with certainty, specimen removed in 2 pieces. Regional lymph nodes: 4 of 4 lymph nodes negative for involvement by   carcinoma   Treatment effect: No known presurgical therapy   Pathologic stage classification (pTNM, AJCC 8th edition):   pT2 (tumor directly involves visceral pleura), pN0     Test: PD-L1 70R7 FDA Clyde Chatterjee) for NSCLC   Result: EXPRESSED, tumor proportion score: 7%, intensity: 1+     Test: BRAF V600E   Result: NEGATIVE     Test: EGFR mutation analysis by [facility]   Result: NOT DETECTED     Test: FISH analysis ALK   Result: NEGATIVE     Test: FISH analysis ROS1   Result: NEGATIVE     Stage IB (pT2 pN0 M0) Lung Adenocarcinoma  MRI Brain ordered to r/o metastatic disease  Pathology report and NCCN guidelines reviewed with patient. No indication for adjuvant chemotherapy. Surveillance per NCCN guidelines.   F/u CT chest ordered by pulmonary team and scheduled on 10/16/2020  RTC 10/21/2020    Thank you for allowing us to participate in the care of  Tanisha Thrasher MD   4/15/0775  Board Certified Medical Oncologist

## 2020-10-07 ENCOUNTER — TELEPHONE (OUTPATIENT)
Dept: PULMONOLOGY | Age: 65
End: 2020-10-07

## 2020-10-07 NOTE — TELEPHONE ENCOUNTER
Mailed a letter to patient informing him that his CT Chest is scheduled for 11-12-20 at 12:30 pm at the [facility].  He must arrive by 12:00 pm. There is no prep for this test

## 2020-10-16 ENCOUNTER — HOSPITAL ENCOUNTER (OUTPATIENT)
Dept: CT IMAGING | Age: 65
Discharge: HOME | End: 2020-10-18
Payer: COMMERCIAL

## 2020-10-16 ENCOUNTER — HOSPITAL ENCOUNTER (OUTPATIENT)
Dept: MRI IMAGING | Age: 65
Discharge: HOME | End: 2020-10-18
Payer: COMMERCIAL

## 2020-10-16 ENCOUNTER — TELEPHONE (OUTPATIENT)
Dept: PULMONOLOGY | Age: 65
End: 2020-10-16

## 2020-10-16 PROCEDURE — 71250 CT THORAX DX C-: CPT

## 2020-10-16 PROCEDURE — 2580000003 HC RX 258: Performed by: RADIOLOGY

## 2020-10-16 PROCEDURE — 70553 MRI BRAIN STEM W/O & W/DYE: CPT

## 2020-10-16 PROCEDURE — 6360000004 HC RX CONTRAST MEDICATION: Performed by: RADIOLOGY

## 2020-10-16 PROCEDURE — A9577 INJ MULTIHANCE: HCPCS | Performed by: RADIOLOGY

## 2020-10-16 RX ORDER — SODIUM CHLORIDE 0.9 % (FLUSH) 0.9 %
10 SYRINGE (ML) INJECTION 2 TIMES DAILY
Status: DISCONTINUED | OUTPATIENT
Start: 2020-10-16 | End: 2020-10-19 | Stop reason: HOSPADM

## 2020-10-16 RX ADMIN — Medication 10 ML: at 10:20

## 2020-10-16 RX ADMIN — GADOBENATE DIMEGLUMINE 17 ML: 529 INJECTION, SOLUTION INTRAVENOUS at 10:20

## 2020-10-21 ENCOUNTER — OFFICE VISIT (OUTPATIENT)
Dept: ONCOLOGY | Age: 65
End: 2020-10-21
Payer: COMMERCIAL

## 2020-10-21 ENCOUNTER — HOSPITAL ENCOUNTER (OUTPATIENT)
Dept: INFUSION THERAPY | Age: 65
Discharge: HOME | End: 2020-10-21
Payer: COMMERCIAL

## 2020-10-21 VITALS
HEART RATE: 77 BPM | SYSTOLIC BLOOD PRESSURE: 138 MMHG | HEIGHT: 67 IN | BODY MASS INDEX: 29.35 KG/M2 | OXYGEN SATURATION: 98 % | TEMPERATURE: 97.8 F | WEIGHT: 187 LBS | DIASTOLIC BLOOD PRESSURE: 74 MMHG

## 2020-10-21 DIAGNOSIS — C34.11 MALIGNANT NEOPLASM OF UPPER LOBE OF RIGHT LUNG (HCC): ICD-10-CM

## 2020-10-21 PROCEDURE — 99214 OFFICE O/P EST MOD 30 MIN: CPT | Performed by: INTERNAL MEDICINE

## 2020-10-21 PROCEDURE — 99212 OFFICE O/P EST SF 10 MIN: CPT

## 2020-10-21 NOTE — PROGRESS NOTES
Department of [facility] Oncology  Attending Clinic Note    Reason for Visit: F/U on a patient with Non Small Cell Lung Cancer    PCP:  Juancho Sweet MD    History of Present Illness:  61 y/o male with hx of HTN, former smoker (50 pack/year), who was complaining of SOB and cough. CT chest 07/17/2020:  Scattered bilateral pulmonary nodules the largest being a solid nodule in RUL measuring 1.6 cm. PET/CT scan 07/29/2020:  Images reveal abnormal tracer uptake in the right lung at the level the pulmonary nodule peak SUV is 8.4  No convincing evidence of metastatic disease. Robotic right upper lobectomy mediastinal lymph node dissection on 8/21/2020  A.  Right upper lobe of lung, wedge resection: Invasive, well   differentiated adenocarcinoma, see cancer case summary. Carcinoma invades visceral pleura. The parenchymal margin is negative for involvement by malignancy. Intra-alveolar pigmented macrophages and mild emphysema. B.  Superior mediastinal lymph node, excision: Fragments of anthracotic   lymph node with sinus histiocytosis, negative for involvement by   carcinoma. Carollee Maser pulmonary ligament lymph node, excision: Anthracotic lymph   node with sinus histiocytosis, negative for involvement by carcinoma. D.  Subcarinal lymph node #1, excision: Fragments of anthracotic lymph   node with sinus histiocytosis, negative for involvement by carcinoma. E.  Subcarinal lymph node #2, excision: Fragments of anthracotic lymph   node with marked sinus histiocytosis, negative for involvement by   carcinoma. F.  Right upper lobe of lung, lobectomy: Patchy interstitial chronic   inflammation. Scattered intra-alveolar hemosiderin laden macrophages. Focal subpleural fibrosis. Negative for residual carcinoma. Bronchial and vascular margins are negative for involvement by carcinoma. CANCER CASE SUMMARY: Procedure:  Wedge resection with completion lobectomy   Specimen laterality: Right   Tumor site: Upper lobe   Tumor size: 1.2 cm in greatest dimension   Additional dimensions: 1.2 x 1.0 cm   Size of invasive component: 1.0 cm   Tumor focality: Single focus   Histologic type: Invasive adenocarcinoma, lepidic predominant (80%)     Other subtypes present: Papillary (15%), acinar (5%)   Histologic grade: G1/well differentiated   Spread through airspaces: Not identified   Visceral pleural invasion: Present   Lymphovascular invasion: Not identified   Direct invasion of adjacent structures: No adjacent structures present   Margins: All margins are uninvolved by tumor   Margins examined: Bronchial and vascular   Distance of invasive carcinoma from closest margin: Cannot be determined   with certainty, specimen removed in 2 pieces. Regional lymph nodes: 4 of 4 lymph nodes negative for involvement by   carcinoma   Treatment effect: No known presurgical therapy   Pathologic stage classification (pTNM, AJCC 8th edition): pT2 (tumor   directly involves visceral pleura), pN0     Test: PD-L1 66J7 FDA [email or web address]) for NSCLC   Result: EXPRESSED, tumor proportion score: 7%, intensity: 1+     Test: BRAF V600E   Result: NEGATIVE     Test: EGFR mutation analysis by [facility]   Result: NOT DETECTED     Test: FISH analysis ALK   Result: NEGATIVE     Test: FISH analysis ROS1   Result: NEGATIVE     Stage IB (pT2 pN0 M0) Lung Adenocarcinoma  MRI Brain 10/16/2020 negative for metastatic disease  Pathology report and NCCN guidelines reviewed with patient. No indication for adjuvant chemotherapy. Surveillance per NCCN guidelines. CT chest 10/16/2020 Postsurgical changes of right upper lobe pneumonectomy.  There is minimal atelectasis seen within the right suprahilar region medially. Small right pleural effusion with minimal fluid tracking into the superior aspect of the right major fissure. The left lung is clear  There is no appreciable mediastinal or hilar pathological lymphadenopathy.     Review of Systems;  CONSTITUTIONAL: No fever, chills. Fair appetite and energy level. ENMT: Eyes: No diplopia; Nose: No epistaxis. Mouth: No sore throat. RESPIRATORY: No hemoptysis, shortness of breath, cough. CARDIOVASCULAR: No chest pain, palpitations. GASTROINTESTINAL: No nausea/vomiting, abdominal pain, diarrhea/constipation. GENITOURINARY: No dysuria, urinary frequency, hematuria. NEURO: No syncope, presyncope, headache. Remainder:  ROS NEGATIVE    Past Medical History:      Diagnosis Date    Cancer ([facility] [address]     lung    Hyperlipidemia     managed    Hypertension     unstable blood pressure     Medications:  Reviewed and reconciled. Allergies: Allergies   Allergen Reactions    Augmentin [Amoxicillin-Pot Clavulanate]     Ciprofloxacin      Fever       Physical Exam:  /74 (Site: Left Upper Arm, Position: Sitting, Cuff Size: Medium Adult)   Pulse 77   Temp 97.8 °F (36.6 °C) (Temporal)   Ht 5' 7\" (1.702 m)   Wt 187 lb (84.8 kg)   SpO2 98%   BMI 29.29 kg/m²   GENERAL: Alert, oriented x 3, not in acute distress. ECOG PS 1    Impression/Plan:  61 y/o male who underwent Robotic right upper lobectomy mediastinal lymph node dissection on 8/21/2020    A.  Right upper lobe of lung, wedge resection: Invasive, well differentiated adenocarcinoma, see cancer case summary. Carcinoma invades visceral pleura. The parenchymal margin is negative for involvement by malignancy. Intra-alveolar pigmented macrophages and mild emphysema. B.  Superior mediastinal lymph node, excision: Fragments of anthracotic lymph node with sinus histiocytosis, negative for involvement by carcinoma. Sidney Patel pulmonary ligament lymph node, excision: Anthracotic lymph node with sinus histiocytosis, negative for involvement by carcinoma. D.  Subcarinal lymph node #1, excision: Fragments of anthracotic lymph node with sinus histiocytosis, negative for involvement by carcinoma.      E.  Subcarinal lymph node #2, excision: reviewed with patient. No indication for adjuvant chemotherapy. Surveillance per NCCN guidelines. CT chest 10/16/2020 Postsurgical changes of right upper lobe pneumonectomy.  There is minimal atelectasis seen within the right suprahilar region medially. Small right pleural effusion with minimal fluid tracking into the superior aspect of the right major fissure. The left lung is clear  There is no appreciable mediastinal or hilar pathological lymphadenopathy. Images reviewed.   JOSH    RTC 3 months with prior CT chest. Continue to follow up with pulmonary team.    Argelia Kenyon MD   12/94/8556  Board Certified Medical Oncologist

## 2020-11-18 ENCOUNTER — TELEPHONE (OUTPATIENT)
Dept: CARDIOLOGY | Age: 65
End: 2020-11-18

## 2020-11-18 NOTE — TELEPHONE ENCOUNTER
SCHEDULED  NUC STRESS TEST FOR 12-20-20. AUTH [id number] PER DR. Jesus Alberto Pedroza OFFICE  REVIEWED COVID CHECKLIST WITH PATIENT.     Electronically signed by Newton Garsia on 11/18/2020 at 9:15 AM

## 2020-11-20 ENCOUNTER — HOSPITAL ENCOUNTER (OUTPATIENT)
Dept: CARDIOLOGY | Age: 65
Discharge: HOME | End: 2020-11-20
Payer: COMMERCIAL

## 2020-11-20 VITALS
HEART RATE: 86 BPM | RESPIRATION RATE: 20 BRPM | HEIGHT: 67 IN | DIASTOLIC BLOOD PRESSURE: 70 MMHG | TEMPERATURE: 97.7 F | OXYGEN SATURATION: 98 % | BODY MASS INDEX: 29.03 KG/M2 | WEIGHT: 185 LBS | SYSTOLIC BLOOD PRESSURE: 130 MMHG

## 2020-11-20 LAB
LV EF: 73 %
LVEF MODALITY: NORMAL

## 2020-11-20 PROCEDURE — 78452 HT MUSCLE IMAGE SPECT MULT: CPT

## 2020-11-20 PROCEDURE — 2580000003 HC RX 258: Performed by: INTERNAL MEDICINE

## 2020-11-20 PROCEDURE — A9500 TC99M SESTAMIBI: HCPCS | Performed by: INTERNAL MEDICINE

## 2020-11-20 PROCEDURE — 93017 CV STRESS TEST TRACING ONLY: CPT

## 2020-11-20 PROCEDURE — 3430000000 HC RX DIAGNOSTIC RADIOPHARMACEUTICAL: Performed by: INTERNAL MEDICINE

## 2020-11-20 RX ORDER — EPINEPHRINE 0.3 MG/.3ML
INJECTION SUBCUTANEOUS PRN
COMMUNITY
Start: 2020-09-11

## 2020-11-20 RX ORDER — SODIUM CHLORIDE 0.9 % (FLUSH) 0.9 %
10 SYRINGE (ML) INJECTION PRN
Status: DISCONTINUED | OUTPATIENT
Start: 2020-11-20 | End: 2020-11-21 | Stop reason: HOSPADM

## 2020-11-20 RX ADMIN — SODIUM CHLORIDE, PRESERVATIVE FREE 10 ML: 5 INJECTION INTRAVENOUS at 12:13

## 2020-11-20 RX ADMIN — SODIUM CHLORIDE, PRESERVATIVE FREE 10 ML: 5 INJECTION INTRAVENOUS at 10:19

## 2020-11-20 RX ADMIN — Medication 9.5 MILLICURIE: at 10:19

## 2020-11-20 RX ADMIN — Medication 34.9 MILLICURIE: at 12:13

## 2020-11-20 NOTE — PROCEDURES
following the intravenous injection of 9.5 mCi of (Tc-Sestamibi) followed by 10 ml of Normal Saline. At peak exercise, the patient was injected intravenously with 34.9 mCi of (Tc-Sestamibi) followed by 10 ml of Normal Saline. Gated post-stress tomographic imaging was performed 20-25 minutes after stress. FINDINGS: The overall quality of the study was good. Left ventricular cavity size was noted to be normal.    Rotational analog analysis demonstrated no patient motion or abnormal extracardiac radioactivity. The gated SPECT stress imaging in the short, vertical long, and horizontal long axis demonstrated normal homogeneous tracer distribution throughout the myocardium. The resting images show no change. Gated SPECT left ventricular ejection fraction was calculated to be 73%, with normal myocardial thickening and wall motion. Impression:    1. Exercise EKG was negative. 2. The patient experienced no chest pain with exercise. 3. The myocardial perfusion imaging was normal.    4. Overall left ventricular systolic function was normal without regional wall motion abnormalities. 5. Shah treadmill score was 7 implying low risk. 6. Exercise capacity was above average. 7. Low risk general exercise treadmill test.    Thank you for sending your patient to this [employer].      Electronically signed by Radha Tucker MD on 11/20/20 at 3:17 PM EST

## 2020-11-23 ENCOUNTER — TELEPHONE (OUTPATIENT)
Dept: CASE MANAGEMENT | Age: 65
End: 2020-11-23

## 2020-11-23 NOTE — TELEPHONE ENCOUNTER
Received a call from patients wife requesting if possible to change to virtual visit for next appointment in February if labs ar not needed. Spoke with Dilshad Alejandro RN and Dr Negar Chacko is agreeable for appointment to be virtual visit due to patient request. Updated Patient's wife Winston Speak that appointment will be the same date and time but will be a virtual visit. She was appreciative of information and change to virtual appointment.

## 2020-12-08 ENCOUNTER — TELEPHONE (OUTPATIENT)
Dept: PULMONOLOGY | Age: 65
End: 2020-12-08

## 2020-12-08 NOTE — TELEPHONE ENCOUNTER
Call returned to pt after receiving VM that Insurance requires Peer to Peer call to get approval for Chest CT with [facility]([phone] Options#4) Advised that Dr. Jensen Cabral is covering ICU and will likely complete the Peer to Peer call next week. Advised pt office will follow up after Peer to Peer is completed.

## 2020-12-10 ENCOUNTER — TELEPHONE (OUTPATIENT)
Dept: PULMONOLOGY | Age: 65
End: 2020-12-10

## 2020-12-10 NOTE — TELEPHONE ENCOUNTER
Mailed a letter to patient informing him that his CT Chest is scheduled for 1-12-21 at 11:00 am at [address] [place] [address].  He must arrive by 10:30 am. There is no prep for this test

## 2021-01-12 ENCOUNTER — HOSPITAL ENCOUNTER (OUTPATIENT)
Dept: CT IMAGING | Age: 66
Discharge: HOME | End: 2021-01-14
Payer: COMMERCIAL

## 2021-01-12 DIAGNOSIS — R91.1 LUNG NODULE: ICD-10-CM

## 2021-01-12 PROCEDURE — 71250 CT THORAX DX C-: CPT

## 2021-01-25 ENCOUNTER — IMMUNIZATION (OUTPATIENT)
Dept: PRIMARY CARE CLINIC | Age: 66
End: 2021-01-25
Payer: COMMERCIAL

## 2021-01-25 DIAGNOSIS — Z23 NEED FOR VACCINATION: Primary | ICD-10-CM

## 2021-01-25 PROCEDURE — 91300 COVID-19, PFIZER VACCINE 30MCG/0.3ML DOSE: CPT | Performed by: PHYSICIAN ASSISTANT

## 2021-01-25 PROCEDURE — 0001A PR IMM ADMN SARSCOV2 30MCG/0.3ML DIL RECON 1ST DOSE: CPT | Performed by: PHYSICIAN ASSISTANT

## 2021-01-26 ENCOUNTER — TELEPHONE (OUTPATIENT)
Dept: PULMONOLOGY | Age: 66
End: 2021-01-26

## 2021-01-26 DIAGNOSIS — J44.9 CHRONIC OBSTRUCTIVE PULMONARY DISEASE, UNSPECIFIED COPD TYPE (HCC): ICD-10-CM

## 2021-01-26 DIAGNOSIS — C34.90 MALIGNANT NEOPLASM OF LUNG, UNSPECIFIED LATERALITY, UNSPECIFIED PART OF LUNG (HCC): Primary | ICD-10-CM

## 2021-01-26 NOTE — TELEPHONE ENCOUNTER
Multiple VM from pt concerned about recent CT scan. Dr. Junito Plummer. Reviewed CT scan report and results. Assured pt that post surgical changes are noted and everything looks okay. He compared previous scans. Allowed pt to verbalized any questions. Plan for follow up Chest CT to be scheduled in 6 mos. Office will mail appt card and follow up in office.

## 2021-02-01 ENCOUNTER — VIRTUAL VISIT (OUTPATIENT)
Dept: ONCOLOGY | Age: 66
End: 2021-02-01
Payer: COMMERCIAL

## 2021-02-01 DIAGNOSIS — C34.90 NON-SMALL CELL LUNG CANCER, UNSPECIFIED LATERALITY (HCC): ICD-10-CM

## 2021-02-01 PROCEDURE — 99213 OFFICE O/P EST LOW 20 MIN: CPT | Performed by: INTERNAL MEDICINE

## 2021-02-01 NOTE — PROGRESS NOTES
dimension   Additional dimensions: 1.2 x 1.0 cm   Size of invasive component: 1.0 cm   Tumor focality: Single focus   Histologic type: Invasive adenocarcinoma, lepidic predominant (80%)     Other subtypes present: Papillary (15%), acinar (5%)   Histologic grade: G1/well differentiated   Spread through airspaces: Not identified   Visceral pleural invasion: Present   Lymphovascular invasion: Not identified   Direct invasion of adjacent structures: No adjacent structures present   Margins: All margins are uninvolved by tumor   Margins examined: Bronchial and vascular   Distance of invasive carcinoma from closest margin: Cannot be determined   with certainty, specimen removed in 2 pieces. Regional lymph nodes: 4 of 4 lymph nodes negative for involvement by   carcinoma   Treatment effect: No known presurgical therapy   Pathologic stage classification (pTNM, AJCC 8th edition): pT2 (tumor   directly involves visceral pleura), pN0     Test: PD-L1 72S0 FDA [employer]) for NSCLC   Result: EXPRESSED, tumor proportion score: 7%, intensity: 1+     Test: BRAF V600E   Result: NEGATIVE     Test: EGFR mutation analysis by Avi   Result: NOT DETECTED     Test: FISH analysis ALK   Result: NEGATIVE     Test: FISH analysis ROS1   Result: NEGATIVE     Stage IB (pT2 pN0 M0) Lung Adenocarcinoma  MRI Brain 10/16/2020 negative for metastatic disease  Pathology report and NCCN guidelines reviewed with patient. No indication for adjuvant chemotherapy. Surveillance per NCCN guidelines. CT chest 10/16/2020 Postsurgical changes of right upper lobe pneumonectomy.  There is minimal atelectasis seen within the right suprahilar region medially. Small right pleural effusion with minimal fluid tracking into the superior aspect of the right major fissure. The left lung is clear  There is no appreciable mediastinal or hilar pathological lymphadenopathy. Telehealth Virtual Video Visit Doxy. me 02/01/2021; provider in clinic. patient at home.  Fair appetite and energy level. No chest pain or SOB. No nausea/vomiting. Review of Systems;  CONSTITUTIONAL: No fever. Fair appetite and energy level. RESPIRATORY: No hemoptysis, shortness of breath. CARDIOVASCULAR: No chest pain, palpitations. GASTROINTESTINAL: No nausea/vomiting, abdominal pain. Remainder:  ROS NEGATIVE    Past Medical History:      Diagnosis Date    Cancer ([address]     lung    Hyperlipidemia     managed    Hypertension     unstable blood pressure     Medications:  Reviewed and reconciled. Allergies: Allergies   Allergen Reactions    Augmentin [Amoxicillin-Pot Clavulanate] Diarrhea     Stomach cramping    Ciprofloxacin      Fever       Impression/Plan:  59 y/o male who underwent Robotic right upper lobectomy mediastinal lymph node dissection on 8/21/2020    A.  Right upper lobe of lung, wedge resection: Invasive, well differentiated adenocarcinoma, see cancer case summary. Carcinoma invades visceral pleura. The parenchymal margin is negative for involvement by malignancy. Intra-alveolar pigmented macrophages and mild emphysema. B.  Superior mediastinal lymph node, excision: Fragments of anthracotic lymph node with sinus histiocytosis, negative for involvement by carcinoma. Ofelia Fanning pulmonary ligament lymph node, excision: Anthracotic lymph node with sinus histiocytosis, negative for involvement by carcinoma. D.  Subcarinal lymph node #1, excision: Fragments of anthracotic lymph node with sinus histiocytosis, negative for involvement by carcinoma. E.  Subcarinal lymph node #2, excision: Fragments of anthracotic lymph node with marked sinus histiocytosis, negative for involvement by carcinoma. F.  Right upper lobe of lung, lobectomy: Patchy interstitial chronic inflammation. Scattered intra-alveolar hemosiderin laden macrophages. Focal subpleural fibrosis. Negative for residual carcinoma.    Bronchial and vascular margins are negative for involvement by carcinoma. CANCER CASE SUMMARY: Procedure: Wedge resection with completion lobectomy   Specimen laterality: Right   Tumor site: Upper lobe   Tumor size: 1.2 cm in greatest dimension   Additional dimensions: 1.2 x 1.0 cm   Size of invasive component: 1.0 cm   Tumor focality: Single focus   Histologic type: Invasive adenocarcinoma, lepidic predominant (80%)     Other subtypes present: Papillary (15%), acinar (5%)   Histologic grade: G1/well differentiated   Spread through airspaces: Not identified   Visceral pleural invasion: Present   Lymphovascular invasion: Not identified   Direct invasion of adjacent structures: No adjacent structures present   Margins: All margins are uninvolved by tumor   Margins examined: Bronchial and vascular   Distance of invasive carcinoma from closest margin: Cannot be determined   with certainty, specimen removed in 2 pieces. Regional lymph nodes: 4 of 4 lymph nodes negative for involvement by   carcinoma   Treatment effect: No known presurgical therapy   Pathologic stage classification (pTNM, AJCC 8th edition):   pT2 (tumor directly involves visceral pleura), pN0     Test: PD-L1 81S7 FDA [employer]) for NSCLC   Result: EXPRESSED, tumor proportion score: 7%, intensity: 1+     Test: BRAF V600E   Result: NEGATIVE     Test: EGFR mutation analysis by Avi   Result: NOT DETECTED     Test: FISH analysis ALK   Result: NEGATIVE     Test: FISH analysis ROS1   Result: NEGATIVE     Stage IB (pT2 pN0 M0) Lung Adenocarcinoma  MRI Brain 10/16/2020 negative for metastatic disease    NCCN guidelines reviewed with patient. No indication for adjuvant chemotherapy. Surveillance per NCCN guidelines. CT chest 10/16/2020 Postsurgical changes of right upper lobe pneumonectomy. There is minimal atelectasis seen within the right suprahilar region medially. Small right pleural effusion with minimal fluid tracking into the superior aspect of the right major fissure.   The left lung is clear  There is no appreciable mediastinal or hilar pathological lymphadenopathy. CT chest 01/12/2021 post-surgical changes and no convincing evidence of recurrent/metastatic disease. Images reviewed. Reviewed and re-emphasized to patient that we follow NCCN guidelines in cancer care; surveillance CT chest per NCCN guidelines   He verbalized understanding and had all his questions answered to his satisfaction. He was very satisfied with the answers, virtual encounter and declined a second opinion. No clinical evidence of recurrence. RTC 6 months with prior CT chest. Continue to follow up with pulmonary team (Dr. Mortimer Carbon).     Gillian Patel MD   2/4/7495  Board Certified Medical Oncologist

## 2021-02-15 ENCOUNTER — IMMUNIZATION (OUTPATIENT)
Dept: PRIMARY CARE CLINIC | Age: 66
End: 2021-02-15
Payer: COMMERCIAL

## 2021-02-15 PROCEDURE — 0002A COVID-19, PFIZER VACCINE 30MCG/0.3ML DOSE: CPT | Performed by: NURSE PRACTITIONER

## 2021-02-15 PROCEDURE — 91300 COVID-19, PFIZER VACCINE 30MCG/0.3ML DOSE: CPT | Performed by: NURSE PRACTITIONER

## 2021-04-19 ENCOUNTER — TELEPHONE (OUTPATIENT)
Dept: PULMONOLOGY | Age: 66
End: 2021-04-19

## 2021-06-08 ENCOUNTER — TELEPHONE (OUTPATIENT)
Dept: PULMONOLOGY | Age: 66
End: 2021-06-08

## 2021-06-28 ENCOUNTER — HOSPITAL ENCOUNTER (OUTPATIENT)
Dept: CT IMAGING | Age: 66
Discharge: HOME | End: 2021-06-30
Payer: MEDICARE

## 2021-06-28 DIAGNOSIS — C34.90 MALIGNANT NEOPLASM OF LUNG, UNSPECIFIED LATERALITY, UNSPECIFIED PART OF LUNG (HCC): ICD-10-CM

## 2021-06-28 DIAGNOSIS — J44.9 CHRONIC OBSTRUCTIVE PULMONARY DISEASE, UNSPECIFIED COPD TYPE (HCC): ICD-10-CM

## 2021-06-28 PROCEDURE — 71250 CT THORAX DX C-: CPT

## 2021-07-06 ENCOUNTER — TELEPHONE (OUTPATIENT)
Dept: PULMONOLOGY | Age: 66
End: 2021-07-06

## 2021-07-06 NOTE — TELEPHONE ENCOUNTER
Call from pt wanting results from recent Chest Ct. Per Dr. Raghu Fleming recent Chest CT is good; unchanged and no new issues noted. Advised pt on financial billing assistance line as wife has issues with previous Chest CT billing.

## 2021-08-02 ENCOUNTER — OFFICE VISIT (OUTPATIENT)
Dept: PULMONOLOGY | Age: 66
End: 2021-08-02
Payer: MEDICARE

## 2021-08-02 VITALS
DIASTOLIC BLOOD PRESSURE: 72 MMHG | TEMPERATURE: 97.1 F | HEART RATE: 85 BPM | WEIGHT: 198 LBS | BODY MASS INDEX: 31.08 KG/M2 | OXYGEN SATURATION: 98 % | SYSTOLIC BLOOD PRESSURE: 139 MMHG | HEIGHT: 67 IN | RESPIRATION RATE: 16 BRPM

## 2021-08-02 DIAGNOSIS — R09.82 POST-NASAL DRIP: ICD-10-CM

## 2021-08-02 DIAGNOSIS — R06.02 SOB (SHORTNESS OF BREATH): ICD-10-CM

## 2021-08-02 DIAGNOSIS — C34.11 MALIGNANT NEOPLASM OF UPPER LOBE OF RIGHT LUNG (HCC): ICD-10-CM

## 2021-08-02 DIAGNOSIS — R91.1 NODULE OF RIGHT LUNG: ICD-10-CM

## 2021-08-02 DIAGNOSIS — R49.0 HOARSENESS, PERSISTENT: Primary | ICD-10-CM

## 2021-08-02 LAB
EXPIRATORY TIME-POST: 7.36 SEC
EXPIRATORY TIME: 6.91 SEC
FEF 25-75% %CHNG: 34
FEF 25-75% %PRED-POST: 93 %
FEF 25-75% %PRED-PRE: 69 L/SEC
FEF 25-75% PRED: 2.5 L/SEC
FEF 25-75%-POST: 2.33 L/SEC
FEF 25-75%-PRE: 1.73 L/SEC
FEV1 %PRED-POST: 72 %
FEV1 %PRED-PRE: 67 %
FEV1 PRED: 3.07 L
FEV1-POST: 2.22 L
FEV1-PRE: 2.07 L
FEV1/FVC %PRED-POST: 101 %
FEV1/FVC %PRED-PRE: 101 %
FEV1/FVC PRED: 77 %
FEV1/FVC-POST: 78 %
FEV1/FVC-PRE: 78 %
FVC %PRED-POST: 70 L
FVC %PRED-PRE: 66 %
FVC PRED: 3.99 L
FVC-POST: 2.83 L
FVC-PRE: 2.65 L
PARTS PER BILLION: 16
PEF %PRED-POST: 95 %
PEF %PRED-PRE: 83 L/SEC
PEF PRED: 8.13 L/SEC
PEF%CHNG: 13
PEF-POST: 7.77 L/SEC
PEF-PRE: 6.82 L/SEC

## 2021-08-02 PROCEDURE — 1036F TOBACCO NON-USER: CPT | Performed by: INTERNAL MEDICINE

## 2021-08-02 PROCEDURE — 4040F PNEUMOC VAC/ADMIN/RCVD: CPT | Performed by: INTERNAL MEDICINE

## 2021-08-02 PROCEDURE — 99213 OFFICE O/P EST LOW 20 MIN: CPT | Performed by: INTERNAL MEDICINE

## 2021-08-02 PROCEDURE — 99214 OFFICE O/P EST MOD 30 MIN: CPT | Performed by: INTERNAL MEDICINE

## 2021-08-02 PROCEDURE — 3017F COLORECTAL CA SCREEN DOC REV: CPT | Performed by: INTERNAL MEDICINE

## 2021-08-02 PROCEDURE — G8417 CALC BMI ABV UP PARAM F/U: HCPCS | Performed by: INTERNAL MEDICINE

## 2021-08-02 PROCEDURE — G8427 DOCREV CUR MEDS BY ELIG CLIN: HCPCS | Performed by: INTERNAL MEDICINE

## 2021-08-02 PROCEDURE — 1123F ACP DISCUSS/DSCN MKR DOCD: CPT | Performed by: INTERNAL MEDICINE

## 2021-08-02 PROCEDURE — 94060 EVALUATION OF WHEEZING: CPT | Performed by: INTERNAL MEDICINE

## 2021-08-02 RX ORDER — MONTELUKAST SODIUM 10 MG/1
10 TABLET ORAL NIGHTLY
Qty: 30 TABLET | Refills: 6 | Status: SHIPPED
Start: 2021-08-02 | End: 2021-08-24

## 2021-08-02 RX ORDER — ALBUTEROL SULFATE 90 UG/1
2 AEROSOL, METERED RESPIRATORY (INHALATION) EVERY 4 HOURS PRN
Qty: 1 INHALER | Refills: 6 | Status: SHIPPED | OUTPATIENT
Start: 2021-08-02 | End: 2021-09-01

## 2021-08-02 ASSESSMENT — PULMONARY FUNCTION TESTS
FEV1/FVC_PRE: 78
FVC_PRE: 2.65
FEV1/FVC_PERCENT_PREDICTED_POST: 101
FVC_PERCENT_PREDICTED_PRE: 66
FVC_POST: 2.83
FVC_PERCENT_PREDICTED_POST: 70
FEV1_POST: 2.22
FEV1/FVC_PREDICTED: 77
FEV1/FVC_PERCENT_PREDICTED_PRE: 101
FEV1/FVC_POST: 78
FVC_PREDICTED: 3.99
FEV1_PREDICTED: 3.07
FEV1_PERCENT_PREDICTED_POST: 72
FEV1_PERCENT_PREDICTED_PRE: 67
FEV1_PRE: 2.07

## 2021-08-02 NOTE — PROGRESS NOTES
6 mos office appt follow up; wife with pt. Spirometry completed at today's visit(some reactive airway noted). Reviewed testing along with recent Chest CT images and all of pt's questions were answered. Discussed referral to ENT(re persistent hoarseness/cough associated) ; Pt has requested Dr. Claudio Hinojosa group. Office to process referral as requested per Dr. Leon Perrin. Pt also reports post nasal drainage that may be relieved with Claritin/Singulair. Pt agrees to try Singulair x 1 month along with Protonix (previously taken for GERD). Advised to try the medications to see if this helps cough/hoarseness. Follow up in office in 6 mos; appt card given.

## 2021-08-02 NOTE — PROGRESS NOTES
Department of Internal Medicine  Division of Pulmonary, Critical Care & Sleep Medicine  Pulmonary [facility]                                             Pulmonary Clinic Consult     I had the pleasure of seeing  Alicia Freeman in the [facility] regarding their Lung nodule       Follow up post surgery and RUL resecetion     HISTORY OF PRESENT ILLNESS:    Alicia Freeman is a 73y.o. year old  Who smoke for more than 30-35 years and has about 50 PPY smoking and work in [address] [place] [address]   The Patient comes in with SOB and mid chest tightness at times and epithat has been going on for the last few  Associated with mild cough . ,He  states that it get worse with exercise or walking long distance and he can walk 1 block And go 1-2 flight of stairs before get short winded    No weight lossSleep history :  Snoring   Feel tired during the day  Wake up fresh  During the work up has CT chest that shows RUL nodule 1.6 along with other small less than 4 mm nodules  PET scan shows uptake 8 SUV   No mediastinal lesions     Today visit   He under went RUL resection (Lobectomy ) start to have rib pain yesterday with no chest pain and no fever or chills and has some cough and congestion  He bring clear sputum  Still with some wheezing at time  No chest pain   No hemoptysis  No weight loss              ALLERGIES:    Allergies   Allergen Reactions    Augmentin [Amoxicillin-Pot Clavulanate] Diarrhea     Stomach cramping    Ciprofloxacin      Fever         PAST MEDICAL HISTORY:       Diagnosis Date    Cancer ([address]     lung    Hyperlipidemia     managed    Hypertension     unstable blood pressure       MEDICATIONS:   Current Outpatient Medications   Medication Sig Dispense Refill    EPINEPHrine (EPIPEN) 0.3 MG/0.3ML SOAJ injection Inject into the skin as needed      albuterol sulfate HFA (PROAIR HFA) 108 (90 Base) MCG/ACT inhaler Inhale 2 puffs into the lungs every 4 hours as needed for Wheezing or Shortness of Breath 1 Inhaler 6    SYMBICORT 80-4.5 MCG/ACT AERO Inhale 2 puffs into the lungs daily (Patient not taking: Reported on 2021) 1 Inhaler 3    docusate sodium (COLACE) 100 MG capsule Take 1 capsule by mouth 2 times daily as needed for Constipation (constipation) (Patient taking differently: Take 100 mg by mouth 2 times daily as needed for Constipation (constipation) Indications: Patient no longer taking ) 20 capsule 0    amLODIPine (NORVASC) 5 MG tablet Take 5 mg by mouth daily      ALPRAZolam (XANAX) 0.5 MG tablet Take 1 tablet by mouth daily as needed.  Cholecalciferol (VITAMIN D3 PO) Take 1 capsule by mouth daily      Multiple Vitamins-Minerals (MULTIVITAMIN ADULT PO) Take 1 tablet by mouth daily      atorvastatin (LIPITOR) 10 MG tablet Take 10 mg by mouth daily      pantoprazole (PROTONIX) 20 MG tablet Take 20 mg by mouth daily       No current facility-administered medications for this visit. SOCIAL AND OCCUPATIONAL HEALTH:  Social History     Tobacco Use   Smoking Status Former Smoker    Years: 15.00    Types: Cigarettes    Quit date: 47    Years since quittin.6   Smokeless Tobacco Never Used         SURGICAL HISTORY:   Past Surgical History:   Procedure Laterality Date    COLONOSCOPY      POLYP    ENDOSCOPY, COLON, DIAGNOSTIC      THORACOSCOPY N/A 2020    BRONCHOSCOPY RIGHT THORACOSCOPY ROBOTIC VIDEO ASSISTED XI WITH UPPER LOBE WEDGE, POSS. LOBECTOMY [facility] HISTORY:   Lung cancer:No  DVT or PE No     REVIEW OF SYSTEMS:  Constitutional: General health is good . There has been no weight changes. No fevers, fatigue or weakness. Head: Patient denies any history of trauma, convulsive disorder or syncope. Skin:  Patient denies history of changes in pigmentation, eruptions or pruritus. No easy bruising or bleeding.   EENT: no nasal congestion   Cardiovascular ,No chest pain ,No edema ,  Respiration:SOB: + ,THURSTON :+,cough ,  Gastrointestinal:GERD   Musculoskeletal: no joint pain ,no swelling  Neurological:no , syncope. Denies twitching, convulsions, loss of consciousness or memory. Endocrine:  . No history of goiter, exophthalmos or dryness of skin. The patient has no history of diabetes. Hematopoietic:  No history of bleeding disorders or easy bruising. Rheumatic:  No connective tissue disease history or polyarthritis/inflammatory joint disease. 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            5- industrial exposure ,  6- Harshness     PLAN:      Will refer to ENT for laryngoscope ,as he has hoarseness and r/o any upper airway lesion  -S/P Lobectomy ,pain seems related to surgery ,advise to contact Dr Ledezma Click as needed \  _ Symbicort cause some issues   _want to see dr Cata Banda up for epigastric pain gastroesophageal reflux disease vs cardiac as per his PCP ,advise to start protonox   If after all not better ,will get CT   Albuterol/singilar trial  NIOX 16  PFT ,spirmetery non specific ,lwo ERV   CT scan as per oncology     Sincerely,        Elena Ford MD  Pulmonary & Critical Care Medicine     NOTE: This report was transcribed using voice recognition software. Every effort was made to ensure accuracy; however, inadvertent computerized transcription errors may be present.

## 2021-08-12 DIAGNOSIS — C34.90 NON-SMALL CELL LUNG CANCER, UNSPECIFIED LATERALITY (HCC): Primary | ICD-10-CM

## 2021-08-13 ENCOUNTER — HOSPITAL ENCOUNTER (OUTPATIENT)
Dept: INFUSION THERAPY | Age: 66
Discharge: HOME | End: 2021-08-13
Payer: MEDICARE

## 2021-08-13 ENCOUNTER — OFFICE VISIT (OUTPATIENT)
Dept: ONCOLOGY | Age: 66
End: 2021-08-13
Payer: MEDICARE

## 2021-08-13 VITALS
SYSTOLIC BLOOD PRESSURE: 149 MMHG | HEART RATE: 77 BPM | BODY MASS INDEX: 30.7 KG/M2 | TEMPERATURE: 97.3 F | WEIGHT: 196 LBS | DIASTOLIC BLOOD PRESSURE: 73 MMHG

## 2021-08-13 DIAGNOSIS — C34.90 NON-SMALL CELL LUNG CANCER, UNSPECIFIED LATERALITY (HCC): Primary | ICD-10-CM

## 2021-08-13 DIAGNOSIS — C34.90 NON-SMALL CELL LUNG CANCER, UNSPECIFIED LATERALITY (HCC): ICD-10-CM

## 2021-08-13 LAB
ALBUMIN SERPL-MCNC: 4.3 G/DL (ref 3.5–5.2)
ALP BLD-CCNC: 79 U/L (ref 40–129)
ALT SERPL-CCNC: 28 U/L (ref 0–40)
ANION GAP SERPL CALCULATED.3IONS-SCNC: 6 MMOL/L (ref 7–16)
AST SERPL-CCNC: 18 U/L (ref 0–39)
BASOPHILS ABSOLUTE: 0.01 E9/L (ref 0–0.2)
BASOPHILS RELATIVE PERCENT: 0.2 % (ref 0–2)
BILIRUB SERPL-MCNC: 0.5 MG/DL (ref 0–1.2)
BUN BLDV-MCNC: 15 MG/DL (ref 6–23)
CALCIUM SERPL-MCNC: 9.1 MG/DL (ref 8.6–10.2)
CHLORIDE BLD-SCNC: 104 MMOL/L (ref 98–107)
CO2: 28 MMOL/L (ref 22–29)
CREAT SERPL-MCNC: 1.1 MG/DL (ref 0.7–1.2)
EOSINOPHILS ABSOLUTE: 0.41 E9/L (ref 0.05–0.5)
EOSINOPHILS RELATIVE PERCENT: 7.4 % (ref 0–6)
GFR AFRICAN AMERICAN: >60
GFR NON-AFRICAN AMERICAN: >60 ML/MIN/1.73
GLUCOSE BLD-MCNC: 88 MG/DL (ref 74–99)
HCT VFR BLD CALC: 43.7 % (ref 37–54)
HEMOGLOBIN: 15 G/DL (ref 12.5–16.5)
IMMATURE GRANULOCYTES #: 0.05 E9/L
IMMATURE GRANULOCYTES %: 0.9 % (ref 0–5)
LYMPHOCYTES ABSOLUTE: 1.18 E9/L (ref 1.5–4)
LYMPHOCYTES RELATIVE PERCENT: 21.3 % (ref 20–42)
MCH RBC QN AUTO: 31.3 PG (ref 26–35)
MCHC RBC AUTO-ENTMCNC: 34.3 % (ref 32–34.5)
MCV RBC AUTO: 91 FL (ref 80–99.9)
MONOCYTES ABSOLUTE: 0.59 E9/L (ref 0.1–0.95)
MONOCYTES RELATIVE PERCENT: 10.6 % (ref 2–12)
NEUTROPHILS ABSOLUTE: 3.31 E9/L (ref 1.8–7.3)
NEUTROPHILS RELATIVE PERCENT: 59.6 % (ref 43–80)
PDW BLD-RTO: 11.9 FL (ref 11.5–15)
PLATELET # BLD: 142 E9/L (ref 130–450)
PMV BLD AUTO: 9.7 FL (ref 7–12)
POTASSIUM SERPL-SCNC: 4.7 MMOL/L (ref 3.5–5)
RBC # BLD: 4.8 E12/L (ref 3.8–5.8)
SODIUM BLD-SCNC: 138 MMOL/L (ref 132–146)
TOTAL PROTEIN: 7 G/DL (ref 6.4–8.3)
WBC # BLD: 5.6 E9/L (ref 4.5–11.5)

## 2021-08-13 PROCEDURE — G8427 DOCREV CUR MEDS BY ELIG CLIN: HCPCS | Performed by: INTERNAL MEDICINE

## 2021-08-13 PROCEDURE — 85025 COMPLETE CBC W/AUTO DIFF WBC: CPT

## 2021-08-13 PROCEDURE — 4040F PNEUMOC VAC/ADMIN/RCVD: CPT | Performed by: INTERNAL MEDICINE

## 2021-08-13 PROCEDURE — 3017F COLORECTAL CA SCREEN DOC REV: CPT | Performed by: INTERNAL MEDICINE

## 2021-08-13 PROCEDURE — 1036F TOBACCO NON-USER: CPT | Performed by: INTERNAL MEDICINE

## 2021-08-13 PROCEDURE — 99214 OFFICE O/P EST MOD 30 MIN: CPT | Performed by: INTERNAL MEDICINE

## 2021-08-13 PROCEDURE — 80053 COMPREHEN METABOLIC PANEL: CPT

## 2021-08-13 PROCEDURE — 36415 COLL VENOUS BLD VENIPUNCTURE: CPT

## 2021-08-13 PROCEDURE — 99213 OFFICE O/P EST LOW 20 MIN: CPT

## 2021-08-13 PROCEDURE — G8417 CALC BMI ABV UP PARAM F/U: HCPCS | Performed by: INTERNAL MEDICINE

## 2021-08-13 PROCEDURE — 1123F ACP DISCUSS/DSCN MKR DOCD: CPT | Performed by: INTERNAL MEDICINE

## 2021-08-13 NOTE — PROGRESS NOTES
dimension   Additional dimensions: 1.2 x 1.0 cm   Size of invasive component: 1.0 cm   Tumor focality: Single focus   Histologic type: Invasive adenocarcinoma, lepidic predominant (80%)     Other subtypes present: Papillary (15%), acinar (5%)   Histologic grade: G1/well differentiated   Spread through airspaces: Not identified   Visceral pleural invasion: Present   Lymphovascular invasion: Not identified   Direct invasion of adjacent structures: No adjacent structures present   Margins: All margins are uninvolved by tumor   Margins examined: Bronchial and vascular   Distance of invasive carcinoma from closest margin: Cannot be determined   with certainty, specimen removed in 2 pieces. Regional lymph nodes: 4 of 4 lymph nodes negative for involvement by   carcinoma   Treatment effect: No known presurgical therapy   Pathologic stage classification (pTNM, AJCC 8th edition): pT2 (tumor   directly involves visceral pleura), pN0     Test: PD-L1 46S0 FDA Yvette Cyr) for NSCLC   Result: EXPRESSED, tumor proportion score: 7%, intensity: 1+     Test: BRAF V600E   Result: NEGATIVE     Test: EGFR mutation analysis by [employer]   Result: NOT DETECTED     Test: FISH analysis ALK   Result: NEGATIVE     Test: FISH analysis ROS1   Result: NEGATIVE     Stage IB (pT2 pN0 M0) Lung Adenocarcinoma  MRI Brain 10/16/2020 negative for metastatic disease  The patient was seen by Dr. Lata Spivey, he was not recommended adjuvant chemotherapy. Surveillance per NCCN guidelines. The patient is doing well overall, has a good appetite, no weight loss, no shortness of breath. The chest wall pain is not significant, does not require any medications at this time. Review of Systems;  CONSTITUTIONAL: No fever. Good appetite and energy level. RESPIRATORY: No hemoptysis, shortness of breath. CARDIOVASCULAR: No chest pain, palpitations. GASTROINTESTINAL: No nausea/vomiting, abdominal pain.   Remainder:  ROS NEGATIVE    Past Medical History: Diagnosis Date    [facility])     lung    Hyperlipidemia     managed    Hypertension     unstable blood pressure     Medications:  Reviewed and reconciled. Allergies: Allergies   Allergen Reactions    Augmentin [Amoxicillin-Pot Clavulanate] Diarrhea     Stomach cramping    Ciprofloxacin      Fever       Impression/Plan:  59 y/o male who underwent Robotic right upper lobectomy mediastinal lymph node dissection on 8/21/2020    A.  Right upper lobe of lung, wedge resection: Invasive, well differentiated adenocarcinoma, see cancer case summary. Carcinoma invades visceral pleura. The parenchymal margin is negative for involvement by malignancy. Intra-alveolar pigmented macrophages and mild emphysema. B.  Superior mediastinal lymph node, excision: Fragments of anthracotic lymph node with sinus histiocytosis, negative for involvement by carcinoma. Emily Sages pulmonary ligament lymph node, excision: Anthracotic lymph node with sinus histiocytosis, negative for involvement by carcinoma. D.  Subcarinal lymph node #1, excision: Fragments of anthracotic lymph node with sinus histiocytosis, negative for involvement by carcinoma. E.  Subcarinal lymph node #2, excision: Fragments of anthracotic lymph node with marked sinus histiocytosis, negative for involvement by carcinoma. F.  Right upper lobe of lung, lobectomy: Patchy interstitial chronic inflammation. Scattered intra-alveolar hemosiderin laden macrophages. Focal subpleural fibrosis. Negative for residual carcinoma. Bronchial and vascular margins are negative for involvement by carcinoma. CANCER CASE SUMMARY: Procedure: Wedge resection with completion lobectomy   Specimen laterality: Right   Tumor site: Upper lobe   Tumor size: 1.2 cm in greatest dimension   Additional dimensions: 1.2 x 1.0 cm   Size of invasive component: 1.0 cm   Tumor focality: Single focus   Histologic type:  Invasive adenocarcinoma, lepidic predominant (80%)     Other subtypes present: Papillary (15%), acinar (5%)   Histologic grade: G1/well differentiated   Spread through airspaces: Not identified   Visceral pleural invasion: Present   Lymphovascular invasion: Not identified   Direct invasion of adjacent structures: No adjacent structures present   Margins: All margins are uninvolved by tumor   Margins examined: Bronchial and vascular   Distance of invasive carcinoma from closest margin: Cannot be determined   with certainty, specimen removed in 2 pieces. Regional lymph nodes: 4 of 4 lymph nodes negative for involvement by   carcinoma   Treatment effect: No known presurgical therapy   Pathologic stage classification (pTNM, AJCC 8th edition):   pT2 (tumor directly involves visceral pleura), pN0     Test: PD-L1 29F4 FDA Vijay Claire) for NSCLC   Result: EXPRESSED, tumor proportion score: 7%, intensity: 1+     Test: BRAF V600E   Result: NEGATIVE     Test: EGFR mutation analysis by [place]   Result: NOT DETECTED     Test: FISH analysis ALK   Result: NEGATIVE     Test: FISH analysis ROS1   Result: NEGATIVE     Stage IB (pT2 pN0 M0) Lung Adenocarcinoma  MRI Brain 10/16/2020 negative for metastatic disease    The patient was under the care of Dr. Dubois Overall, adjuvant chemotherapy was not recommended. The patient is doing well clinically, CT scan of the chest was done on 6/28/2021 for surveillance, it had revealed postop changes in the right lung without evidence of recurrent disease. The surveillance guidelines were reviewed with the patient. We will continue with surveillance, he will have a repeat chest CT scan done in December. Discussed that if he has any concerns on the chest CT a PET scan will be indicated, he will let me know if he has any new problems.      Ramonita Rivero MD   HEMATOLOGY/MEDICAL ONCOLOGY  [address] [place] [address] MED ONCOLOGY  [facility] [address] [facility] [address]  Dept: [phone]

## 2021-08-24 DIAGNOSIS — R09.82 POST-NASAL DRIP: ICD-10-CM

## 2021-08-24 DIAGNOSIS — R49.0 HOARSENESS, PERSISTENT: ICD-10-CM

## 2021-08-24 DIAGNOSIS — R06.02 SOB (SHORTNESS OF BREATH): ICD-10-CM

## 2021-08-24 RX ORDER — MONTELUKAST SODIUM 10 MG/1
TABLET ORAL
Qty: 30 TABLET | Refills: 6 | Status: SHIPPED
Start: 2021-08-24 | End: 2022-03-15 | Stop reason: SDUPTHER

## 2021-09-29 DIAGNOSIS — R49.0 HOARSENESS, PERSISTENT: Primary | ICD-10-CM

## 2021-09-29 RX ORDER — PANTOPRAZOLE SODIUM 40 MG/1
40 TABLET, DELAYED RELEASE ORAL
Qty: 30 TABLET | Refills: 12 | Status: SHIPPED
Start: 2021-09-29 | End: 2022-10-03

## 2021-09-29 NOTE — PROGRESS NOTES
Pt requesting refill script for Protonix as discussed with Dr. Tammie Stokes. Pt had used up previous script her had and now they are \"\". Script sent per orders to [facility] as requested.

## 2021-12-07 ENCOUNTER — HOSPITAL ENCOUNTER (OUTPATIENT)
Dept: CT IMAGING | Age: 66
Discharge: HOME | End: 2021-12-09
Payer: MEDICARE

## 2021-12-07 DIAGNOSIS — C34.90 NON-SMALL CELL LUNG CANCER, UNSPECIFIED LATERALITY (HCC): ICD-10-CM

## 2021-12-07 PROCEDURE — 71250 CT THORAX DX C-: CPT

## 2021-12-14 ENCOUNTER — OFFICE VISIT (OUTPATIENT)
Dept: ONCOLOGY | Age: 66
End: 2021-12-14
Payer: MEDICARE

## 2021-12-14 ENCOUNTER — HOSPITAL ENCOUNTER (OUTPATIENT)
Dept: INFUSION THERAPY | Age: 66
Discharge: HOME | End: 2021-12-14
Payer: MEDICARE

## 2021-12-14 VITALS
RESPIRATION RATE: 16 BRPM | DIASTOLIC BLOOD PRESSURE: 75 MMHG | WEIGHT: 195.9 LBS | TEMPERATURE: 97.3 F | HEART RATE: 71 BPM | OXYGEN SATURATION: 98 % | BODY MASS INDEX: 30.68 KG/M2 | SYSTOLIC BLOOD PRESSURE: 139 MMHG

## 2021-12-14 DIAGNOSIS — C34.90 NON-SMALL CELL LUNG CANCER, UNSPECIFIED LATERALITY (HCC): Primary | ICD-10-CM

## 2021-12-14 DIAGNOSIS — C34.90 NON-SMALL CELL LUNG CANCER, UNSPECIFIED LATERALITY (HCC): ICD-10-CM

## 2021-12-14 LAB
ALBUMIN SERPL-MCNC: 4.7 G/DL (ref 3.5–5.2)
ALP BLD-CCNC: 78 U/L (ref 40–129)
ALT SERPL-CCNC: 21 U/L (ref 0–40)
ANION GAP SERPL CALCULATED.3IONS-SCNC: 8 MMOL/L (ref 7–16)
AST SERPL-CCNC: 16 U/L (ref 0–39)
BASOPHILS ABSOLUTE: 0.02 E9/L (ref 0–0.2)
BASOPHILS RELATIVE PERCENT: 0.3 % (ref 0–2)
BILIRUB SERPL-MCNC: 0.7 MG/DL (ref 0–1.2)
BUN BLDV-MCNC: 15 MG/DL (ref 6–23)
CALCIUM SERPL-MCNC: 9.5 MG/DL (ref 8.6–10.2)
CHLORIDE BLD-SCNC: 102 MMOL/L (ref 98–107)
CO2: 28 MMOL/L (ref 22–29)
CREAT SERPL-MCNC: 1.2 MG/DL (ref 0.7–1.2)
EOSINOPHILS ABSOLUTE: 0.31 E9/L (ref 0.05–0.5)
EOSINOPHILS RELATIVE PERCENT: 5.1 % (ref 0–6)
GFR AFRICAN AMERICAN: >60
GFR NON-AFRICAN AMERICAN: >60 ML/MIN/1.73
GLUCOSE BLD-MCNC: 103 MG/DL (ref 74–99)
HCT VFR BLD CALC: 45.3 % (ref 37–54)
HEMOGLOBIN: 15.5 G/DL (ref 12.5–16.5)
IMMATURE GRANULOCYTES #: 0.02 E9/L
IMMATURE GRANULOCYTES %: 0.3 % (ref 0–5)
LYMPHOCYTES ABSOLUTE: 1.17 E9/L (ref 1.5–4)
LYMPHOCYTES RELATIVE PERCENT: 19.3 % (ref 20–42)
MCH RBC QN AUTO: 30.5 PG (ref 26–35)
MCHC RBC AUTO-ENTMCNC: 34.2 % (ref 32–34.5)
MCV RBC AUTO: 89.2 FL (ref 80–99.9)
MONOCYTES ABSOLUTE: 0.58 E9/L (ref 0.1–0.95)
MONOCYTES RELATIVE PERCENT: 9.6 % (ref 2–12)
NEUTROPHILS ABSOLUTE: 3.96 E9/L (ref 1.8–7.3)
NEUTROPHILS RELATIVE PERCENT: 65.4 % (ref 43–80)
PDW BLD-RTO: 12.4 FL (ref 11.5–15)
PLATELET # BLD: 151 E9/L (ref 130–450)
PMV BLD AUTO: 10.2 FL (ref 7–12)
POTASSIUM SERPL-SCNC: 5.2 MMOL/L (ref 3.5–5)
RBC # BLD: 5.08 E12/L (ref 3.8–5.8)
SODIUM BLD-SCNC: 138 MMOL/L (ref 132–146)
TOTAL PROTEIN: 6.9 G/DL (ref 6.4–8.3)
WBC # BLD: 6.1 E9/L (ref 4.5–11.5)

## 2021-12-14 PROCEDURE — G8427 DOCREV CUR MEDS BY ELIG CLIN: HCPCS | Performed by: INTERNAL MEDICINE

## 2021-12-14 PROCEDURE — 99212 OFFICE O/P EST SF 10 MIN: CPT | Performed by: INTERNAL MEDICINE

## 2021-12-14 PROCEDURE — 85025 COMPLETE CBC W/AUTO DIFF WBC: CPT

## 2021-12-14 PROCEDURE — 1036F TOBACCO NON-USER: CPT | Performed by: INTERNAL MEDICINE

## 2021-12-14 PROCEDURE — 99214 OFFICE O/P EST MOD 30 MIN: CPT | Performed by: INTERNAL MEDICINE

## 2021-12-14 PROCEDURE — 1123F ACP DISCUSS/DSCN MKR DOCD: CPT | Performed by: INTERNAL MEDICINE

## 2021-12-14 PROCEDURE — G8417 CALC BMI ABV UP PARAM F/U: HCPCS | Performed by: INTERNAL MEDICINE

## 2021-12-14 PROCEDURE — 4040F PNEUMOC VAC/ADMIN/RCVD: CPT | Performed by: INTERNAL MEDICINE

## 2021-12-14 PROCEDURE — G8484 FLU IMMUNIZE NO ADMIN: HCPCS | Performed by: INTERNAL MEDICINE

## 2021-12-14 PROCEDURE — 36415 COLL VENOUS BLD VENIPUNCTURE: CPT

## 2021-12-14 PROCEDURE — 3017F COLORECTAL CA SCREEN DOC REV: CPT | Performed by: INTERNAL MEDICINE

## 2021-12-14 PROCEDURE — 80053 COMPREHEN METABOLIC PANEL: CPT

## 2021-12-14 NOTE — PROGRESS NOTES
dimension   Additional dimensions: 1.2 x 1.0 cm   Size of invasive component: 1.0 cm   Tumor focality: Single focus   Histologic type: Invasive adenocarcinoma, lepidic predominant (80%)     Other subtypes present: Papillary (15%), acinar (5%)   Histologic grade: G1/well differentiated   Spread through airspaces: Not identified   Visceral pleural invasion: Present   Lymphovascular invasion: Not identified   Direct invasion of adjacent structures: No adjacent structures present   Margins: All margins are uninvolved by tumor   Margins examined: Bronchial and vascular   Distance of invasive carcinoma from closest margin: Cannot be determined   with certainty, specimen removed in 2 pieces. Regional lymph nodes: 4 of 4 lymph nodes negative for involvement by   carcinoma   Treatment effect: No known presurgical therapy   Pathologic stage classification (pTNM, AJCC 8th edition): pT2 (tumor   directly involves visceral pleura), pN0     Test: PD-L1 83F8 FDA Kristel Rocha) for NSCLC   Result: EXPRESSED, tumor proportion score: 7%, intensity: 1+     Test: BRAF V600E   Result: NEGATIVE     Test: EGFR mutation analysis by [place]   Result: NOT DETECTED     Test: FISH analysis ALK   Result: NEGATIVE     Test: FISH analysis ROS1   Result: NEGATIVE     Stage IB (pT2 pN0 M0) Lung Adenocarcinoma  MRI Brain 10/16/2020 negative for metastatic disease  The patient was seen by Dr. Odilon Lynch, he was not recommended adjuvant chemotherapy. Surveillance per NCCN guidelines. The patient is doing well overall, his weight is stable, he does have acid reflux and cough. Review of Systems;  CONSTITUTIONAL: No fever. Good appetite and energy level. RESPIRATORY: No hemoptysis, shortness of breath. CARDIOVASCULAR: No chest pain, palpitations. GASTROINTESTINAL: No nausea/vomiting, abdominal pain.   Remainder:  ROS NEGATIVE    Past Medical History:      Diagnosis Date    Cancer ([facility] [address]     lung    Hyperlipidemia     managed    Hypertension unstable blood pressure     Medications:  Reviewed and reconciled. Allergies: Allergies   Allergen Reactions    Augmentin [Amoxicillin-Pot Clavulanate] Diarrhea     Stomach cramping    Ciprofloxacin      Fever       Impression/Plan:  61 y/o male who underwent Robotic right upper lobectomy mediastinal lymph node dissection on 8/21/2020    A.  Right upper lobe of lung, wedge resection: Invasive, well differentiated adenocarcinoma, see cancer case summary. Carcinoma invades visceral pleura. The parenchymal margin is negative for involvement by malignancy. Intra-alveolar pigmented macrophages and mild emphysema. B.  Superior mediastinal lymph node, excision: Fragments of anthracotic lymph node with sinus histiocytosis, negative for involvement by carcinoma. Nguyen Chiles pulmonary ligament lymph node, excision: Anthracotic lymph node with sinus histiocytosis, negative for involvement by carcinoma. D.  Subcarinal lymph node #1, excision: Fragments of anthracotic lymph node with sinus histiocytosis, negative for involvement by carcinoma. E.  Subcarinal lymph node #2, excision: Fragments of anthracotic lymph node with marked sinus histiocytosis, negative for involvement by carcinoma. F.  Right upper lobe of lung, lobectomy: Patchy interstitial chronic inflammation. Scattered intra-alveolar hemosiderin laden macrophages. Focal subpleural fibrosis. Negative for residual carcinoma. Bronchial and vascular margins are negative for involvement by carcinoma. CANCER CASE SUMMARY: Procedure: Wedge resection with completion lobectomy   Specimen laterality: Right   Tumor site: Upper lobe   Tumor size: 1.2 cm in greatest dimension   Additional dimensions: 1.2 x 1.0 cm   Size of invasive component: 1.0 cm   Tumor focality: Single focus   Histologic type:  Invasive adenocarcinoma, lepidic predominant (80%)     Other subtypes present: Papillary (15%), acinar (5%)   Histologic grade: G1/well differentiated   Spread through airspaces: Not identified   Visceral pleural invasion: Present   Lymphovascular invasion: Not identified   Direct invasion of adjacent structures: No adjacent structures present   Margins: All margins are uninvolved by tumor   Margins examined: Bronchial and vascular   Distance of invasive carcinoma from closest margin: Cannot be determined   with certainty, specimen removed in 2 pieces. Regional lymph nodes: 4 of 4 lymph nodes negative for involvement by   carcinoma   Treatment effect: No known presurgical therapy   Pathologic stage classification (pTNM, AJCC 8th edition):   pT2 (tumor directly involves visceral pleura), pN0     Test: PD-L1 11Z3 FDA Janie Aniya) for NSCLC   Result: EXPRESSED, tumor proportion score: 7%, intensity: 1+     Test: BRAF V600E   Result: NEGATIVE     Test: EGFR mutation analysis by [facility]   Result: NOT DETECTED     Test: FISH analysis ALK   Result: NEGATIVE     Test: FISH analysis ROS1   Result: NEGATIVE     Stage IB (pT2 pN0 M0) Lung Adenocarcinoma  MRI Brain 10/16/2020 negative for metastatic disease    The patient was under the care of Dr. Pura Mejía, adjuvant chemotherapy was not recommended. The patient is doing well clinically, CT scan of the chest was done on 12/7/2021 for surveillance, it had revealed unchanged postsurgical appearance of a right upper lobectomy, a 4 mm left upper lobe lung nodule is unchanged since 2020, no new nodules or masses were identified, the results and images were reviewed. Discussed with Dr. Ignacia Bynum, he will review the chest CT scan and let me know if we should repeat the chest scan in 3 months or 6 months. We will update the patient about the plan.     Denton Woodall MD   HEMATOLOGY/MEDICAL ONCOLOGY  [address] [place] [address] MED ONCOLOGY  [facility] [address] [facility] [address]  Dept: [phone]

## 2021-12-17 ENCOUNTER — TELEPHONE (OUTPATIENT)
Dept: ONCOLOGY | Age: 66
End: 2021-12-17

## 2021-12-17 NOTE — TELEPHONE ENCOUNTER
Spoke with Dr. Sheldon Andersen regarding a message from pt asking about a CT from Dr. Moris Paulino LM for pt as soon as Dr Sheldon Andersen is able to get a return call from Dr Moris Paulino this office will let him know the response on regards to when the CT should be scheduled.

## 2021-12-27 DIAGNOSIS — C34.90 NON-SMALL CELL LUNG CANCER, UNSPECIFIED LATERALITY (HCC): Primary | ICD-10-CM

## 2021-12-27 NOTE — PROGRESS NOTES
Please Christine Diggs, let the patient know I reviewed his chest CT with Dr. Ignacia Bynum, the lung nodule is stable, he recommended f/up chest CT in 6 months, I did order it, please change the F/up for 6 months with chest Ct prior, thank you, JUSTIN

## 2021-12-29 ENCOUNTER — TELEPHONE (OUTPATIENT)
Dept: INFUSION THERAPY | Age: 66
End: 2021-12-29

## 2021-12-29 NOTE — TELEPHONE ENCOUNTER
butch and informed patient that Dr. Will Campos spoke with Dr. Ramírez Goodman and the recommendation for his repeat CT scan is 6 months.   Patient verbalizes understanding encouraged to call with needs

## 2022-02-04 ENCOUNTER — TELEPHONE (OUTPATIENT)
Dept: PULMONOLOGY | Age: 67
End: 2022-02-04

## 2022-02-04 NOTE — TELEPHONE ENCOUNTER
Call to advise pt that appt needs to be reschedule due to weather and provider unavailable this am. Pt not having any issues and is agreeable to reschedule in office appt. Office to mail out appt card.

## 2022-02-21 ENCOUNTER — HOSPITAL ENCOUNTER (OUTPATIENT)
Age: 67
Discharge: HOME | End: 2022-02-21
Payer: MEDICARE

## 2022-02-21 DIAGNOSIS — C34.90 NON-SMALL CELL LUNG CANCER, UNSPECIFIED LATERALITY (HCC): ICD-10-CM

## 2022-02-21 LAB
ALBUMIN SERPL-MCNC: 4.9 G/DL (ref 3.5–5.2)
ALP BLD-CCNC: 83 U/L (ref 40–129)
ALT SERPL-CCNC: 26 U/L (ref 0–40)
ANION GAP SERPL CALCULATED.3IONS-SCNC: 10 MMOL/L (ref 7–16)
AST SERPL-CCNC: 18 U/L (ref 0–39)
BILIRUB SERPL-MCNC: 0.8 MG/DL (ref 0–1.2)
BUN BLDV-MCNC: 17 MG/DL (ref 6–23)
CALCIUM SERPL-MCNC: 9.5 MG/DL (ref 8.6–10.2)
CHLORIDE BLD-SCNC: 99 MMOL/L (ref 98–107)
CO2: 28 MMOL/L (ref 22–29)
CREAT SERPL-MCNC: 1.2 MG/DL (ref 0.7–1.2)
GFR AFRICAN AMERICAN: >60
GFR NON-AFRICAN AMERICAN: >60 ML/MIN/1.73
GLUCOSE BLD-MCNC: 116 MG/DL (ref 74–99)
POTASSIUM SERPL-SCNC: 5 MMOL/L (ref 3.5–5)
SODIUM BLD-SCNC: 137 MMOL/L (ref 132–146)
TOTAL PROTEIN: 7.2 G/DL (ref 6.4–8.3)

## 2022-02-21 PROCEDURE — 80053 COMPREHEN METABOLIC PANEL: CPT

## 2022-02-21 PROCEDURE — 36415 COLL VENOUS BLD VENIPUNCTURE: CPT

## 2022-03-15 ENCOUNTER — OFFICE VISIT (OUTPATIENT)
Dept: PULMONOLOGY | Age: 67
End: 2022-03-15
Payer: MEDICARE

## 2022-03-15 VITALS
DIASTOLIC BLOOD PRESSURE: 75 MMHG | HEIGHT: 67 IN | TEMPERATURE: 96.6 F | RESPIRATION RATE: 18 BRPM | BODY MASS INDEX: 30.61 KG/M2 | HEART RATE: 78 BPM | WEIGHT: 195 LBS | OXYGEN SATURATION: 98 % | SYSTOLIC BLOOD PRESSURE: 161 MMHG

## 2022-03-15 DIAGNOSIS — C34.90 MALIGNANT NEOPLASM OF LUNG, UNSPECIFIED LATERALITY, UNSPECIFIED PART OF LUNG (HCC): Primary | ICD-10-CM

## 2022-03-15 DIAGNOSIS — R09.82 POST-NASAL DRIP: ICD-10-CM

## 2022-03-15 DIAGNOSIS — R06.02 SOB (SHORTNESS OF BREATH): ICD-10-CM

## 2022-03-15 DIAGNOSIS — R49.0 HOARSENESS, PERSISTENT: ICD-10-CM

## 2022-03-15 PROCEDURE — 1036F TOBACCO NON-USER: CPT | Performed by: INTERNAL MEDICINE

## 2022-03-15 PROCEDURE — 4040F PNEUMOC VAC/ADMIN/RCVD: CPT | Performed by: INTERNAL MEDICINE

## 2022-03-15 PROCEDURE — 99213 OFFICE O/P EST LOW 20 MIN: CPT | Performed by: INTERNAL MEDICINE

## 2022-03-15 PROCEDURE — 99214 OFFICE O/P EST MOD 30 MIN: CPT | Performed by: INTERNAL MEDICINE

## 2022-03-15 PROCEDURE — 1123F ACP DISCUSS/DSCN MKR DOCD: CPT | Performed by: INTERNAL MEDICINE

## 2022-03-15 PROCEDURE — G8417 CALC BMI ABV UP PARAM F/U: HCPCS | Performed by: INTERNAL MEDICINE

## 2022-03-15 PROCEDURE — G8427 DOCREV CUR MEDS BY ELIG CLIN: HCPCS | Performed by: INTERNAL MEDICINE

## 2022-03-15 PROCEDURE — G8484 FLU IMMUNIZE NO ADMIN: HCPCS | Performed by: INTERNAL MEDICINE

## 2022-03-15 PROCEDURE — 3017F COLORECTAL CA SCREEN DOC REV: CPT | Performed by: INTERNAL MEDICINE

## 2022-03-15 RX ORDER — MONTELUKAST SODIUM 10 MG/1
TABLET ORAL
Qty: 90 TABLET | Refills: 3 | Status: SHIPPED | OUTPATIENT
Start: 2022-03-15

## 2022-03-15 NOTE — PROGRESS NOTES
Department of Internal Medicine  Division of Pulmonary, Critical Care & Sleep Medicine  Pulmonary [address]                                             Pulmonary Clinic Consult     I had the pleasure of seeing  Rachel Corea in the [facility] regarding their Lung nodule       Follow up post surgery and RUL resecetion     HISTORY OF PRESENT ILLNESS:    Rachel Corea is a 73y.o. year old  Who smoke for more than 30-35 years and has about 50 PPY smoking and work in [address] [place] [address]   The Patient comes in with SOB and mid chest tightness at times and epithat has been going on for the last few  Associated with mild cough . ,He  states that it get worse with exercise or walking long distance and he can walk 1 block And go 1-2 flight of stairs before get short winded    No weight lossSleep history :  Snoring   Feel tired during the day  Wake up fresh  During the work up has CT chest that shows RUL nodule 1.6 along with other small less than 4 mm nodules  PET scan shows uptake 8 SUV   No mediastinal lesions     Today visit   He under went RUL resection (Lobectomy ) start to have rib pain yesterday with no chest pain and no fever or chills and has some cough and congestion  He bring clear sputum  Still with some wheezing at time  No chest pain   No hemoptysis  No weight loss              ALLERGIES:    Allergies   Allergen Reactions    Augmentin [Amoxicillin-Pot Clavulanate] Diarrhea     Stomach cramping    Ciprofloxacin      Fever         PAST MEDICAL HISTORY:       Diagnosis Date    Cancer ([address]     lung    Hyperlipidemia     managed    Hypertension     unstable blood pressure       MEDICATIONS:   Current Outpatient Medications   Medication Sig Dispense Refill    pantoprazole (PROTONIX) 40 MG tablet Take 1 tablet by mouth every morning (before breakfast) 30 tablet 12    montelukast (SINGULAIR) 10 MG tablet TAKE 1 TABLET BY MOUTH EVERY DAY AT NIGHT (Patient not taking: Reported on 2021) 30 tablet 6    albuterol sulfate HFA (PROAIR HFA) 108 (90 Base) MCG/ACT inhaler Inhale 2 puffs into the lungs every 4 hours as needed for Wheezing or Shortness of Breath 1 Inhaler 6    EPINEPHrine (EPIPEN) 0.3 MG/0.3ML SOAJ injection Inject into the skin as needed      SYMBICORT 80-4.5 MCG/ACT AERO Inhale 2 puffs into the lungs daily (Patient not taking: Reported on 3/15/2022) 1 Inhaler 3    docusate sodium (COLACE) 100 MG capsule Take 1 capsule by mouth 2 times daily as needed for Constipation (constipation) (Patient not taking: Reported on 2021) 20 capsule 0    amLODIPine (NORVASC) 5 MG tablet Take 5 mg by mouth daily      ALPRAZolam (XANAX) 0.5 MG tablet Take 1 tablet by mouth daily as needed. (Patient not taking: Reported on 2021)      Cholecalciferol (VITAMIN D3 PO) Take 1 capsule by mouth daily      Multiple Vitamins-Minerals (MULTIVITAMIN ADULT PO) Take 1 tablet by mouth daily (Patient not taking: Reported on 2021)      atorvastatin (LIPITOR) 10 MG tablet Take 10 mg by mouth daily       No current facility-administered medications for this visit. SOCIAL AND OCCUPATIONAL HEALTH:  Social History     Tobacco Use   Smoking Status Former Smoker    Years: 15.00    Types: Cigarettes    Quit date: 46    Years since quittin.2   Smokeless Tobacco Never Used         SURGICAL HISTORY:   Past Surgical History:   Procedure Laterality Date    COLONOSCOPY      POLYP    ENDOSCOPY, COLON, DIAGNOSTIC      THORACOSCOPY N/A 2020    BRONCHOSCOPY RIGHT THORACOSCOPY ROBOTIC VIDEO ASSISTED XI WITH UPPER LOBE WEDGE, POSS. LOBECTOMY performed by Kennon Kocher, MD at [facility] HISTORY:   Lung cancer:No  DVT or PE No     REVIEW OF SYSTEMS:  Constitutional: General health is good . There has been no weight changes. No fevers, fatigue or weakness. Head: Patient denies any history of trauma, convulsive disorder or syncope.     Skin:  Patient denies history of changes in pigmentation, eruptions or pruritus. No easy bruising or bleeding. EENT: no nasal congestion   Cardiovascular ,No chest pain ,No edema ,  Respiration:SOB: + ,THURSTON :+,cough ,  Gastrointestinal:GERD   Musculoskeletal: no joint pain ,no swelling  Neurological:no , syncope. Denies twitching, convulsions, loss of consciousness or memory. Endocrine:  . No history of goiter, exophthalmos or dryness of skin. The patient has no history of diabetes. Hematopoietic:  No history of bleeding disorders or easy bruising. Rheumatic:  No connective tissue disease history or polyarthritis/inflammatory joint disease. 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            5- industrial exposure ,  6- Harshness     PLAN:      Seen by ENT and was told he is on   Had polyps by ENT followed  - start Singulair as he can not tolerate ICS   -S/P Lobectomy ,pain seems related to surgery ,advise to contact Dr Beverly Luevano as needed   _ Symbicort cause some issues   _want to see dr Yesenia Frausto ,CT in June   _Work up for epigastric pain gastroesophageal reflux disease vs cardiac as per his PCP ,advise to start protonox    Albuterol/singilar trial  NIOX 16  PFT ,spirmetery non specific ,low ERV   CT scan as per oncology   December CT 2021   Advise protonix as he possible GERD    has elevated eosinophilics ,hyper active air way   ,he want to try singular     Sincerely,        Tomer Barrientos MD  Pulmonary & Critical Care Medicine     NOTE: This report was transcribed using voice recognition software.  Every effort was made to ensure accuracy; however, inadvertent computerized transcription errors may be present.

## 2022-03-15 NOTE — PROGRESS NOTES
6 mos follow up in office today. Doing well. Pt to follow up with Dr. Humphrey Siemens and any further CT per oncology. Pt to call office prn for follow up.

## 2022-06-01 ENCOUNTER — HOSPITAL ENCOUNTER (OUTPATIENT)
Dept: CT IMAGING | Age: 67
Discharge: HOME | End: 2022-06-03
Payer: MEDICARE

## 2022-06-01 DIAGNOSIS — C34.90 NON-SMALL CELL LUNG CANCER, UNSPECIFIED LATERALITY (HCC): ICD-10-CM

## 2022-06-01 PROCEDURE — 71250 CT THORAX DX C-: CPT

## 2022-06-21 DIAGNOSIS — C34.90 NON-SMALL CELL LUNG CANCER, UNSPECIFIED LATERALITY (HCC): Primary | ICD-10-CM

## 2022-06-22 ENCOUNTER — TELEPHONE (OUTPATIENT)
Dept: CASE MANAGEMENT | Age: 67
End: 2022-06-22

## 2022-06-22 ENCOUNTER — OFFICE VISIT (OUTPATIENT)
Dept: ONCOLOGY | Age: 67
End: 2022-06-22
Payer: MEDICARE

## 2022-06-22 ENCOUNTER — HOSPITAL ENCOUNTER (OUTPATIENT)
Dept: INFUSION THERAPY | Age: 67
Discharge: HOME | End: 2022-06-22
Payer: MEDICARE

## 2022-06-22 VITALS
BODY MASS INDEX: 30.45 KG/M2 | HEIGHT: 67 IN | SYSTOLIC BLOOD PRESSURE: 139 MMHG | DIASTOLIC BLOOD PRESSURE: 77 MMHG | WEIGHT: 194 LBS | OXYGEN SATURATION: 99 % | TEMPERATURE: 98 F | HEART RATE: 66 BPM

## 2022-06-22 DIAGNOSIS — C34.90 NON-SMALL CELL LUNG CANCER, UNSPECIFIED LATERALITY (HCC): ICD-10-CM

## 2022-06-22 DIAGNOSIS — C34.90 NON-SMALL CELL LUNG CANCER, UNSPECIFIED LATERALITY (HCC): Primary | ICD-10-CM

## 2022-06-22 LAB
ALBUMIN SERPL-MCNC: 4.9 G/DL (ref 3.5–5.2)
ALP BLD-CCNC: 87 U/L (ref 40–129)
ALT SERPL-CCNC: 27 U/L (ref 0–40)
ANION GAP SERPL CALCULATED.3IONS-SCNC: 11 MMOL/L (ref 7–16)
AST SERPL-CCNC: 21 U/L (ref 0–39)
BASOPHILS ABSOLUTE: 0.04 E9/L (ref 0–0.2)
BASOPHILS RELATIVE PERCENT: 0.7 % (ref 0–2)
BILIRUB SERPL-MCNC: 0.8 MG/DL (ref 0–1.2)
BUN BLDV-MCNC: 19 MG/DL (ref 6–23)
CALCIUM SERPL-MCNC: 9.5 MG/DL (ref 8.6–10.2)
CHLORIDE BLD-SCNC: 100 MMOL/L (ref 98–107)
CO2: 27 MMOL/L (ref 22–29)
CREAT SERPL-MCNC: 1.1 MG/DL (ref 0.7–1.2)
EOSINOPHILS ABSOLUTE: 0.33 E9/L (ref 0.05–0.5)
EOSINOPHILS RELATIVE PERCENT: 5.5 % (ref 0–6)
GFR AFRICAN AMERICAN: >60
GFR NON-AFRICAN AMERICAN: >60 ML/MIN/1.73
GLUCOSE BLD-MCNC: 93 MG/DL (ref 74–99)
HCT VFR BLD CALC: 46 % (ref 37–54)
HEMOGLOBIN: 15.9 G/DL (ref 12.5–16.5)
IMMATURE GRANULOCYTES #: 0.03 E9/L
IMMATURE GRANULOCYTES %: 0.5 % (ref 0–5)
LYMPHOCYTES ABSOLUTE: 1.4 E9/L (ref 1.5–4)
LYMPHOCYTES RELATIVE PERCENT: 23.3 % (ref 20–42)
MCH RBC QN AUTO: 31.9 PG (ref 26–35)
MCHC RBC AUTO-ENTMCNC: 34.6 % (ref 32–34.5)
MCV RBC AUTO: 92.2 FL (ref 80–99.9)
MONOCYTES ABSOLUTE: 0.55 E9/L (ref 0.1–0.95)
MONOCYTES RELATIVE PERCENT: 9.1 % (ref 2–12)
NEUTROPHILS ABSOLUTE: 3.67 E9/L (ref 1.8–7.3)
NEUTROPHILS RELATIVE PERCENT: 60.9 % (ref 43–80)
PDW BLD-RTO: 12.2 FL (ref 11.5–15)
PLATELET # BLD: 143 E9/L (ref 130–450)
PMV BLD AUTO: 10 FL (ref 7–12)
POTASSIUM SERPL-SCNC: 4.5 MMOL/L (ref 3.5–5)
RBC # BLD: 4.99 E12/L (ref 3.8–5.8)
SODIUM BLD-SCNC: 138 MMOL/L (ref 132–146)
TOTAL PROTEIN: 7.6 G/DL (ref 6.4–8.3)
WBC # BLD: 6 E9/L (ref 4.5–11.5)

## 2022-06-22 PROCEDURE — 99214 OFFICE O/P EST MOD 30 MIN: CPT | Performed by: INTERNAL MEDICINE

## 2022-06-22 PROCEDURE — G8417 CALC BMI ABV UP PARAM F/U: HCPCS | Performed by: INTERNAL MEDICINE

## 2022-06-22 PROCEDURE — 80053 COMPREHEN METABOLIC PANEL: CPT

## 2022-06-22 PROCEDURE — G8427 DOCREV CUR MEDS BY ELIG CLIN: HCPCS | Performed by: INTERNAL MEDICINE

## 2022-06-22 PROCEDURE — 1123F ACP DISCUSS/DSCN MKR DOCD: CPT | Performed by: INTERNAL MEDICINE

## 2022-06-22 PROCEDURE — 3017F COLORECTAL CA SCREEN DOC REV: CPT | Performed by: INTERNAL MEDICINE

## 2022-06-22 PROCEDURE — 99213 OFFICE O/P EST LOW 20 MIN: CPT | Performed by: INTERNAL MEDICINE

## 2022-06-22 PROCEDURE — 36415 COLL VENOUS BLD VENIPUNCTURE: CPT

## 2022-06-22 PROCEDURE — 85025 COMPLETE CBC W/AUTO DIFF WBC: CPT

## 2022-06-22 PROCEDURE — 1036F TOBACCO NON-USER: CPT | Performed by: INTERNAL MEDICINE

## 2022-06-22 NOTE — PROGRESS NOTES
Department of [facility] Oncology  Attending Clinic Note    Reason for Visit: F/U on a patient with Non Small Cell Lung Cancer    PCP:  Damir Orourke MD    History of Present Illness:  77 y/o male with NSCLC    CT chest 07/17/2020:  Scattered bilateral pulmonary nodules the largest being a solid nodule in RUL measuring 1.6 cm. PET/CT scan 07/29/2020:  Images reveal abnormal tracer uptake in the right lung at the level the pulmonary nodule peak SUV is 8.4  No convincing evidence of metastatic disease. Robotic right upper lobectomy mediastinal lymph node dissection on 8/21/2020  A.  Right upper lobe of lung, wedge resection: Invasive, well   differentiated adenocarcinoma, see cancer case summary. Carcinoma invades visceral pleura. The parenchymal margin is negative for involvement by malignancy. Intra-alveolar pigmented macrophages and mild emphysema. B.  Superior mediastinal lymph node, excision: Fragments of anthracotic   lymph node with sinus histiocytosis, negative for involvement by   carcinoma. Glorya Shown pulmonary ligament lymph node, excision: Anthracotic lymph   node with sinus histiocytosis, negative for involvement by carcinoma. D.  Subcarinal lymph node #1, excision: Fragments of anthracotic lymph   node with sinus histiocytosis, negative for involvement by carcinoma. E.  Subcarinal lymph node #2, excision: Fragments of anthracotic lymph   node with marked sinus histiocytosis, negative for involvement by   carcinoma. F.  Right upper lobe of lung, lobectomy: Patchy interstitial chronic   inflammation. Scattered intra-alveolar hemosiderin laden macrophages. Focal subpleural fibrosis. Negative for residual carcinoma. Bronchial and vascular margins are negative for involvement by carcinoma. CANCER CASE SUMMARY: Procedure:  Wedge resection with completion lobectomy   Specimen laterality: Right   Tumor site: Upper lobe   Tumor size: 1.2 cm in greatest dimension   Additional dimensions: 1.2 x 1.0 cm   Size of invasive component: 1.0 cm   Tumor focality: Single focus   Histologic type: Invasive adenocarcinoma, lepidic predominant (80%)     Other subtypes present: Papillary (15%), acinar (5%)   Histologic grade: G1/well differentiated   Spread through airspaces: Not identified   Visceral pleural invasion: Present   Lymphovascular invasion: Not identified   Direct invasion of adjacent structures: No adjacent structures present   Margins: All margins are uninvolved by tumor   Margins examined: Bronchial and vascular   Distance of invasive carcinoma from closest margin: Cannot be determined   with certainty, specimen removed in 2 pieces. Regional lymph nodes: 4 of 4 lymph nodes negative for involvement by   carcinoma   Treatment effect: No known presurgical therapy   Pathologic stage classification (pTNM, AJCC 8th edition): pT2 (tumor   directly involves visceral pleura), pN0     Test: PD-L1 70H5 FDA Kashfrank Kayser) for NSCLC   Result: EXPRESSED, tumor proportion score: 7%, intensity: 1+     Test: BRAF V600E   Result: NEGATIVE     Test: EGFR mutation analysis by [place]   Result: NOT DETECTED     Test: FISH analysis ALK   Result: NEGATIVE     Test: FISH analysis ROS1   Result: NEGATIVE     Stage IB (pT2 pN0 M0) Lung Adenocarcinoma  MRI Brain 10/16/2020 negative for metastatic disease  The patient was seen by Dr. Piper Hendricks, he was not recommended adjuvant chemotherapy. Surveillance per NCCN guidelines. The patient is doing well overall, he has a chronic cough, it occurs when he eats, the patient has regular follow-up with ENT, he had an upper endoscopy done prior to his diagnosis with the lung cancer, was done in [place]. He did have allergy testing with Dr. Shaw Amaro in the past.  He does not choke when he eats. Review of Systems;  CONSTITUTIONAL: No fever. Good appetite and energy level. RESPIRATORY: No hemoptysis, shortness of breath.   Pos for cough.  CARDIOVASCULAR: No chest pain, palpitations. GASTROINTESTINAL: No nausea/vomiting, abdominal pain. Remainder:  ROS NEGATIVE    Past Medical History:      Diagnosis Date    Cancer ([address]     lung    Hyperlipidemia     managed    Hypertension     unstable blood pressure     Medications:  Reviewed and reconciled. Allergies: Allergies   Allergen Reactions    Augmentin [Amoxicillin-Pot Clavulanate] Diarrhea     Stomach cramping    Ciprofloxacin      Fever       Impression/Plan:  59 y/o male who underwent Robotic right upper lobectomy mediastinal lymph node dissection on 8/21/2020    A.  Right upper lobe of lung, wedge resection: Invasive, well differentiated adenocarcinoma, see cancer case summary. Carcinoma invades visceral pleura. The parenchymal margin is negative for involvement by malignancy. Intra-alveolar pigmented macrophages and mild emphysema. B.  Superior mediastinal lymph node, excision: Fragments of anthracotic lymph node with sinus histiocytosis, negative for involvement by carcinoma. Linda Sa pulmonary ligament lymph node, excision: Anthracotic lymph node with sinus histiocytosis, negative for involvement by carcinoma. D.  Subcarinal lymph node #1, excision: Fragments of anthracotic lymph node with sinus histiocytosis, negative for involvement by carcinoma. E.  Subcarinal lymph node #2, excision: Fragments of anthracotic lymph node with marked sinus histiocytosis, negative for involvement by carcinoma. F.  Right upper lobe of lung, lobectomy: Patchy interstitial chronic inflammation. Scattered intra-alveolar hemosiderin laden macrophages. Focal subpleural fibrosis. Negative for residual carcinoma. Bronchial and vascular margins are negative for involvement by carcinoma. CANCER CASE SUMMARY: Procedure:  Wedge resection with completion lobectomy   Specimen laterality: Right   Tumor site: Upper lobe   Tumor size: 1.2 cm in greatest dimension Additional dimensions: 1.2 x 1.0 cm   Size of invasive component: 1.0 cm   Tumor focality: Single focus   Histologic type: Invasive adenocarcinoma, lepidic predominant (80%)     Other subtypes present: Papillary (15%), acinar (5%)   Histologic grade: G1/well differentiated   Spread through airspaces: Not identified   Visceral pleural invasion: Present   Lymphovascular invasion: Not identified   Direct invasion of adjacent structures: No adjacent structures present   Margins: All margins are uninvolved by tumor   Margins examined: Bronchial and vascular   Distance of invasive carcinoma from closest margin: Cannot be determined   with certainty, specimen removed in 2 pieces. Regional lymph nodes: 4 of 4 lymph nodes negative for involvement by   carcinoma   Treatment effect: No known presurgical therapy   Pathologic stage classification (pTNM, AJCC 8th edition):   pT2 (tumor directly involves visceral pleura), pN0     Test: PD-L1 28F9 FDA Jene Cooks) for NSCLC   Result: EXPRESSED, tumor proportion score: 7%, intensity: 1+     Test: BRAF V600E   Result: NEGATIVE     Test: EGFR mutation analysis by [facility]   Result: NOT DETECTED     Test: FISH analysis ALK   Result: NEGATIVE     Test: FISH analysis ROS1   Result: NEGATIVE     Stage IB (pT2 pN0 M0) Lung Adenocarcinoma  MRI Brain 10/16/2020 negative for metastatic disease    The patient was under the care of Dr. Luis Luciano, adjuvant chemotherapy was not recommended. The patient is doing well clinically, CT scan of the chest was done on 12/7/2021 for surveillance, it had revealed unchanged postsurgical appearance of a right upper lobectomy, a 4 mm left upper lobe lung nodule is unchanged since 2020, no new nodules or masses were identified, the results and images were reviewed. Discussed with Dr. Jamaal Contreras, he commended follow-up chest CT scan after 6 months.     The patient had a chest CT scan done for follow-up on 6/1/2022, the results/images were reviewed, he has stable postsurgical changes in the right upper lobe and suprahilar region along with a 4 mm noncalcified left upper lobe pulmonary nodule, stable, no new nodules. Recommend continued follow-up, will order a chest CT done in 6 months, the patient will follow-up with pulmonary. The patient has a chronic cough, it occurs when he eats, the patient has regular follow-up with ENT, he had an upper endoscopy done prior to his diagnosis with the lung cancer, was done in [place]. He did have allergy testing with Dr. Ruthy Casey in the past.  He does not choke when he eats. The patient will let me know if he would like to be referred again to Dr. Ruthy Casey.       Willow Ramirez MD   HEMATOLOGY/MEDICAL ONCOLOGY  [address] [place] [address] MED ONCOLOGY  [facility] [address] [facility] [address]  Dept: [phone]

## 2022-06-22 NOTE — TELEPHONE ENCOUNTER
Met with patient during follow up appointment with Dr.El Malick santos. Patient completed active treatment August 2020 for Lung cancer. Patient appears well and in good spirits. States that he is doing well after surgery in August 2020 with Dr. Darshan Conye. Reports good appetite, energy, and sleeping well. Provided support and encouragement. Instructed in detail on the patient's Cancer Treatment Summary and Survivorship Care Plan. Copies faxed to patient's PCP. Provided with written copies of each and written resources of Thriving and Surviving Post-Cancer Treatment: Nutritional and Emotional Support Services packet and ACS: Life After Treatment: The Next Chapter in Your Survivorship Journey. Patient appreciative of visit and information. Instructed to call with any questions or concerns. Verbally agreed.

## 2022-09-08 ENCOUNTER — TELEPHONE (OUTPATIENT)
Dept: PULMONOLOGY | Age: 67
End: 2022-09-08

## 2022-09-08 NOTE — TELEPHONE ENCOUNTER
Wife Jennifertiti Pal calling into office to arrange for follow up for Ray to see Pulmonary provider in follow up from Dr. Daniel Brenner last visit. Dr. Mauro Mcelroy recommended pt continue to follow with pulmonary doctor. Office will arrange for follow up appt in office later this year. Wife reports Dr. Mauro Mcelroy has ordered follow up Chest CT to be done in Dec 2022. Pt having chronic cough which is unchanged and follows with ENT provider as well. Wife agreeable to office visit with new provider; no urgent needs.

## 2022-10-03 DIAGNOSIS — R49.0 HOARSENESS, PERSISTENT: ICD-10-CM

## 2022-10-03 RX ORDER — PANTOPRAZOLE SODIUM 40 MG/1
TABLET, DELAYED RELEASE ORAL
Qty: 90 TABLET | Refills: 6 | Status: SHIPPED | OUTPATIENT
Start: 2022-10-03

## 2022-11-14 DIAGNOSIS — C34.90 NON-SMALL CELL LUNG CANCER, UNSPECIFIED LATERALITY (HCC): Primary | ICD-10-CM

## 2022-12-01 ENCOUNTER — HOSPITAL ENCOUNTER (OUTPATIENT)
Dept: CT IMAGING | Age: 67
Discharge: HOME | End: 2022-12-03
Payer: MEDICARE

## 2022-12-01 DIAGNOSIS — C34.90 NON-SMALL CELL LUNG CANCER, UNSPECIFIED LATERALITY (HCC): ICD-10-CM

## 2022-12-01 PROCEDURE — 71250 CT THORAX DX C-: CPT

## 2022-12-07 ENCOUNTER — OFFICE VISIT (OUTPATIENT)
Dept: ONCOLOGY | Age: 67
End: 2022-12-07
Payer: MEDICARE

## 2022-12-07 ENCOUNTER — HOSPITAL ENCOUNTER (OUTPATIENT)
Dept: INFUSION THERAPY | Age: 67
Discharge: HOME | End: 2022-12-07

## 2022-12-07 VITALS
BODY MASS INDEX: 30.92 KG/M2 | HEIGHT: 67 IN | DIASTOLIC BLOOD PRESSURE: 76 MMHG | SYSTOLIC BLOOD PRESSURE: 139 MMHG | OXYGEN SATURATION: 100 % | WEIGHT: 197 LBS | TEMPERATURE: 97.2 F

## 2022-12-07 DIAGNOSIS — C34.90 NON-SMALL CELL LUNG CANCER, UNSPECIFIED LATERALITY (HCC): Primary | ICD-10-CM

## 2022-12-07 PROCEDURE — 3017F COLORECTAL CA SCREEN DOC REV: CPT | Performed by: INTERNAL MEDICINE

## 2022-12-07 PROCEDURE — 1036F TOBACCO NON-USER: CPT | Performed by: INTERNAL MEDICINE

## 2022-12-07 PROCEDURE — G8484 FLU IMMUNIZE NO ADMIN: HCPCS | Performed by: INTERNAL MEDICINE

## 2022-12-07 PROCEDURE — G8427 DOCREV CUR MEDS BY ELIG CLIN: HCPCS | Performed by: INTERNAL MEDICINE

## 2022-12-07 PROCEDURE — G8417 CALC BMI ABV UP PARAM F/U: HCPCS | Performed by: INTERNAL MEDICINE

## 2022-12-07 PROCEDURE — 1123F ACP DISCUSS/DSCN MKR DOCD: CPT | Performed by: INTERNAL MEDICINE

## 2022-12-07 PROCEDURE — 99214 OFFICE O/P EST MOD 30 MIN: CPT | Performed by: INTERNAL MEDICINE

## 2022-12-07 PROCEDURE — 99213 OFFICE O/P EST LOW 20 MIN: CPT | Performed by: INTERNAL MEDICINE

## 2022-12-07 NOTE — PROGRESS NOTES
dimension   Additional dimensions: 1.2 x 1.0 cm   Size of invasive component: 1.0 cm   Tumor focality: Single focus   Histologic type: Invasive adenocarcinoma, lepidic predominant (80%)     Other subtypes present: Papillary (15%), acinar (5%)   Histologic grade: G1/well differentiated   Spread through airspaces: Not identified   Visceral pleural invasion: Present   Lymphovascular invasion: Not identified   Direct invasion of adjacent structures: No adjacent structures present   Margins: All margins are uninvolved by tumor   Margins examined: Bronchial and vascular   Distance of invasive carcinoma from closest margin: Cannot be determined   with certainty, specimen removed in 2 pieces. Regional lymph nodes: 4 of 4 lymph nodes negative for involvement by   carcinoma   Treatment effect: No known presurgical therapy   Pathologic stage classification (pTNM, AJCC 8th edition): pT2 (tumor   directly involves visceral pleura), pN0     Test: PD-L1 72Y0 FDA Natan Sine) for NSCLC   Result: EXPRESSED, tumor proportion score: 7%, intensity: 1+     Test: BRAF V600E   Result: NEGATIVE     Test: EGFR mutation analysis by Avi   Result: NOT DETECTED     Test: FISH analysis ALK   Result: NEGATIVE     Test: FISH analysis ROS1   Result: NEGATIVE     Stage IB (pT2 pN0 M0) Lung Adenocarcinoma  MRI Brain 10/16/2020 negative for metastatic disease  The patient was seen by Dr. Pola Méndez, he was not recommended adjuvant chemotherapy. Surveillance per NCCN guidelines. The patient returns for a follow-up visit, he had back pain, which had resolved, he had increasing activity of the skin of the back on the left side, no skin lesions, had also resolved. He has a chronic cough. Review of Systems;  CONSTITUTIONAL: No fever. Good appetite and energy level. RESPIRATORY: No hemoptysis, shortness of breath. Pos for cough. CARDIOVASCULAR: No chest pain, palpitations. GASTROINTESTINAL: No nausea/vomiting, abdominal pain.   Remainder:  ROS NEGATIVE    Past Medical History:      Diagnosis Date    Cancer ([facility] [address]     lung    Hyperlipidemia     managed    Hypertension     unstable blood pressure     Medications:  Reviewed and reconciled. Allergies: Allergies   Allergen Reactions    Augmentin [Amoxicillin-Pot Clavulanate] Diarrhea     Stomach cramping    Ciprofloxacin      Fever       Impression/Plan:  59 y/o male who underwent Robotic right upper lobectomy mediastinal lymph node dissection on 8/21/2020    A. Right upper lobe of lung, wedge resection: Invasive, well differentiated adenocarcinoma, see cancer case summary. Carcinoma invades visceral pleura. The parenchymal margin is negative for involvement by malignancy. Intra-alveolar pigmented macrophages and mild emphysema. B.  Superior mediastinal lymph node, excision: Fragments of anthracotic lymph node with sinus histiocytosis, negative for involvement by carcinoma. C. Inferior pulmonary ligament lymph node, excision: Anthracotic lymph node with sinus histiocytosis, negative for involvement by carcinoma. D.  Subcarinal lymph node #1, excision: Fragments of anthracotic lymph node with sinus histiocytosis, negative for involvement by carcinoma. E.  Subcarinal lymph node #2, excision: Fragments of anthracotic lymph node with marked sinus histiocytosis, negative for involvement by carcinoma. F.  Right upper lobe of lung, lobectomy: Patchy interstitial chronic inflammation. Scattered intra-alveolar hemosiderin laden macrophages. Focal subpleural fibrosis. Negative for residual carcinoma. Bronchial and vascular margins are negative for involvement by carcinoma. CANCER CASE SUMMARY: Procedure: Wedge resection with completion lobectomy   Specimen laterality: Right   Tumor site: Upper lobe   Tumor size: 1.2 cm in greatest dimension   Additional dimensions: 1.2 x 1.0 cm   Size of invasive component: 1.0 cm   Tumor focality: Single focus   Histologic type:  Invasive adenocarcinoma, lepidic predominant (80%)     Other subtypes present: Papillary (15%), acinar (5%)   Histologic grade: G1/well differentiated   Spread through airspaces: Not identified   Visceral pleural invasion: Present   Lymphovascular invasion: Not identified   Direct invasion of adjacent structures: No adjacent structures present   Margins: All margins are uninvolved by tumor   Margins examined: Bronchial and vascular   Distance of invasive carcinoma from closest margin: Cannot be determined   with certainty, specimen removed in 2 pieces. Regional lymph nodes: 4 of 4 lymph nodes negative for involvement by   carcinoma   Treatment effect: No known presurgical therapy   Pathologic stage classification (pTNM, AJCC 8th edition):   pT2 (tumor directly involves visceral pleura), pN0     Test: PD-L1 42M0 FDA Laci Lias) for NSCLC   Result: EXPRESSED, tumor proportion score: 7%, intensity: 1+     Test: BRAF V600E   Result: NEGATIVE     Test: EGFR mutation analysis by Jackson   Result: NOT DETECTED     Test: FISH analysis ALK   Result: NEGATIVE     Test: FISH analysis ROS1   Result: NEGATIVE     Stage IB (pT2 pN0 M0) Lung Adenocarcinoma  MRI Brain 10/16/2020 negative for metastatic disease    The patient was under the care of Dr. Natalia Yin, adjuvant chemotherapy was not recommended. The patient is doing well clinically, CT scan of the chest was done on 12/7/2021 for surveillance, it had revealed unchanged postsurgical appearance of a right upper lobectomy, a 4 mm left upper lobe lung nodule is unchanged since 2020, no new nodules or masses were identified, the results and images were reviewed. Discussed with Dr. Cindi Alvarez, he commended follow-up chest CT scan after 6 months.     The patient had a chest CT scan done for follow-up on 6/1/2022, the results/images were reviewed, he has stable postsurgical changes in the right upper lobe and suprahilar region along with a 4 mm noncalcified left upper lobe pulmonary nodule,

## 2022-12-20 PROBLEM — K74.00 HEPATIC FIBROSIS: Status: ACTIVE | Noted: 2019-06-12

## 2022-12-20 PROBLEM — K21.9 GASTROESOPHAGEAL REFLUX DISEASE WITHOUT ESOPHAGITIS: Status: ACTIVE | Noted: 2022-12-20

## 2022-12-20 PROBLEM — Z90.2 HISTORY OF LOBECTOMY OF LUNG: Status: ACTIVE | Noted: 2022-12-20

## 2022-12-20 PROBLEM — R73.9 HYPERGLYCEMIA: Status: ACTIVE | Noted: 2022-12-20

## 2023-03-17 ENCOUNTER — HOSPITAL ENCOUNTER (OUTPATIENT)
Dept: CT IMAGING | Age: 68
End: 2023-03-17
Payer: MEDICARE

## 2023-03-17 DIAGNOSIS — J33.0 POLYP OF NASAL CAVITY: ICD-10-CM

## 2023-03-17 PROCEDURE — 70486 CT MAXILLOFACIAL W/O DYE: CPT

## 2023-04-10 ENCOUNTER — TELEPHONE (OUTPATIENT)
Dept: ONCOLOGY | Age: 68
End: 2023-04-10

## 2023-04-17 ENCOUNTER — TELEPHONE (OUTPATIENT)
Dept: INFUSION THERAPY | Age: 68
End: 2023-04-17

## 2023-04-17 NOTE — TELEPHONE ENCOUNTER
Patient c/o right hip pain that radiates into buttocks and down his leg. Sometimes his lower back hurts as well. Patient states PCP will order MRI. He will call [facility] tomorrow regarding MRI. Said RN advised that Dr. King Spearing willing to order MRI, if needed. Patient verbalized understanding.   Juda Snellen, RN 4/17/23 8855

## 2023-04-20 ENCOUNTER — HOSPITAL ENCOUNTER (OUTPATIENT)
Dept: MRI IMAGING | Age: 68
Discharge: HOME | End: 2023-04-22
Payer: MEDICARE

## 2023-04-20 DIAGNOSIS — Z85.118 PERSONAL HISTORY OF OTHER MALIGNANT NEOPLASM OF BRONCHUS AND LUNG: ICD-10-CM

## 2023-04-20 DIAGNOSIS — M54.9 DORSALGIA: ICD-10-CM

## 2023-04-20 DIAGNOSIS — M48.00 SPINAL STENOSIS, UNSPECIFIED SPINAL REGION: ICD-10-CM

## 2023-04-20 PROCEDURE — 72148 MRI LUMBAR SPINE W/O DYE: CPT

## 2023-05-03 ENCOUNTER — OFFICE VISIT (OUTPATIENT)
Dept: ONCOLOGY | Age: 68
End: 2023-05-03
Payer: MEDICARE

## 2023-05-03 ENCOUNTER — HOSPITAL ENCOUNTER (OUTPATIENT)
Dept: INFUSION THERAPY | Age: 68
Discharge: HOME | End: 2023-05-03

## 2023-05-03 VITALS
DIASTOLIC BLOOD PRESSURE: 84 MMHG | BODY MASS INDEX: 30.98 KG/M2 | SYSTOLIC BLOOD PRESSURE: 150 MMHG | WEIGHT: 197.4 LBS | TEMPERATURE: 97.5 F | HEART RATE: 66 BPM | HEIGHT: 67 IN | OXYGEN SATURATION: 100 %

## 2023-05-03 DIAGNOSIS — C34.11 MALIGNANT NEOPLASM OF UPPER LOBE, RIGHT BRONCHUS OR LUNG (HCC): ICD-10-CM

## 2023-05-03 DIAGNOSIS — C34.90 NON-SMALL CELL LUNG CANCER, UNSPECIFIED LATERALITY (HCC): Primary | ICD-10-CM

## 2023-05-03 PROCEDURE — 1036F TOBACCO NON-USER: CPT | Performed by: INTERNAL MEDICINE

## 2023-05-03 PROCEDURE — 99213 OFFICE O/P EST LOW 20 MIN: CPT | Performed by: INTERNAL MEDICINE

## 2023-05-03 PROCEDURE — G8417 CALC BMI ABV UP PARAM F/U: HCPCS | Performed by: INTERNAL MEDICINE

## 2023-05-03 PROCEDURE — G8427 DOCREV CUR MEDS BY ELIG CLIN: HCPCS | Performed by: INTERNAL MEDICINE

## 2023-05-03 PROCEDURE — 99214 OFFICE O/P EST MOD 30 MIN: CPT | Performed by: INTERNAL MEDICINE

## 2023-05-03 PROCEDURE — 1123F ACP DISCUSS/DSCN MKR DOCD: CPT | Performed by: INTERNAL MEDICINE

## 2023-05-03 PROCEDURE — 3017F COLORECTAL CA SCREEN DOC REV: CPT | Performed by: INTERNAL MEDICINE

## 2023-05-03 RX ORDER — VALSARTAN 80 MG/1
80 TABLET ORAL DAILY
COMMUNITY
Start: 2023-04-25

## 2023-05-03 NOTE — PROGRESS NOTES
Patient refused printed AVS, pt states they have MYCHART. All questions answered.
appropriate.        Radames Kapoor MD   HEMATOLOGY/MEDICAL ONCOLOGY  [address] [place] [address] ONCOLOGY  [facility] [address] [facility] [address]  Dept: [phone]

## 2023-05-10 ENCOUNTER — HOSPITAL ENCOUNTER (OUTPATIENT)
Dept: PET IMAGING | Age: 68
Discharge: HOME | End: 2023-05-12
Payer: MEDICARE

## 2023-05-10 DIAGNOSIS — C34.11 MALIGNANT NEOPLASM OF UPPER LOBE, RIGHT BRONCHUS OR LUNG (HCC): ICD-10-CM

## 2023-05-10 DIAGNOSIS — C34.90 NON-SMALL CELL LUNG CANCER, UNSPECIFIED LATERALITY (HCC): ICD-10-CM

## 2023-05-10 LAB — METER GLUCOSE: 96 MG/DL (ref 74–99)

## 2023-05-10 PROCEDURE — 3430000000 HC RX DIAGNOSTIC RADIOPHARMACEUTICAL: Performed by: RADIOLOGY

## 2023-05-10 PROCEDURE — A9552 F18 FDG: HCPCS | Performed by: RADIOLOGY

## 2023-05-10 PROCEDURE — 78815 PET IMAGE W/CT SKULL-THIGH: CPT

## 2023-05-10 PROCEDURE — 82962 GLUCOSE BLOOD TEST: CPT

## 2023-05-10 RX ORDER — FLUDEOXYGLUCOSE F 18 200 MCI/ML
15 INJECTION, SOLUTION INTRAVENOUS
Status: COMPLETED | OUTPATIENT
Start: 2023-05-10 | End: 2023-05-10

## 2023-05-10 RX ADMIN — FLUDEOXYGLUCOSE F 18 15 MILLICURIE: 200 INJECTION, SOLUTION INTRAVENOUS at 09:34

## 2023-05-11 ENCOUNTER — TELEPHONE (OUTPATIENT)
Dept: PULMONOLOGY | Age: 68
End: 2023-05-11

## 2023-05-12 ENCOUNTER — OFFICE VISIT (OUTPATIENT)
Dept: ONCOLOGY | Age: 68
End: 2023-05-12
Payer: MEDICARE

## 2023-05-12 ENCOUNTER — HOSPITAL ENCOUNTER (OUTPATIENT)
Dept: INFUSION THERAPY | Age: 68
Discharge: HOME | End: 2023-05-12

## 2023-05-12 VITALS
HEIGHT: 67 IN | DIASTOLIC BLOOD PRESSURE: 78 MMHG | HEART RATE: 82 BPM | WEIGHT: 196.8 LBS | BODY MASS INDEX: 30.89 KG/M2 | OXYGEN SATURATION: 97 % | SYSTOLIC BLOOD PRESSURE: 135 MMHG

## 2023-05-12 DIAGNOSIS — C34.90 NON-SMALL CELL LUNG CANCER, UNSPECIFIED LATERALITY (HCC): Primary | ICD-10-CM

## 2023-05-12 PROCEDURE — 3017F COLORECTAL CA SCREEN DOC REV: CPT | Performed by: INTERNAL MEDICINE

## 2023-05-12 PROCEDURE — 1123F ACP DISCUSS/DSCN MKR DOCD: CPT | Performed by: INTERNAL MEDICINE

## 2023-05-12 PROCEDURE — 99214 OFFICE O/P EST MOD 30 MIN: CPT | Performed by: INTERNAL MEDICINE

## 2023-05-12 PROCEDURE — G8417 CALC BMI ABV UP PARAM F/U: HCPCS | Performed by: INTERNAL MEDICINE

## 2023-05-12 PROCEDURE — 99212 OFFICE O/P EST SF 10 MIN: CPT

## 2023-05-12 PROCEDURE — 1036F TOBACCO NON-USER: CPT | Performed by: INTERNAL MEDICINE

## 2023-05-12 PROCEDURE — G8427 DOCREV CUR MEDS BY ELIG CLIN: HCPCS | Performed by: INTERNAL MEDICINE

## 2023-05-19 ENCOUNTER — TELEPHONE (OUTPATIENT)
Dept: CT IMAGING | Age: 68
End: 2023-05-19

## 2023-05-19 NOTE — TELEPHONE ENCOUNTER
5/19/2023  Received note from Dr. Leticia Agudelo  that the procedure is not appropriate for image guided needle biopsy  (too small). Note faxed to the office.

## 2023-05-26 ENCOUNTER — TELEPHONE (OUTPATIENT)
Dept: CASE MANAGEMENT | Age: 68
End: 2023-05-26

## 2023-05-26 NOTE — TELEPHONE ENCOUNTER
Dr. Viktor Arango requesting [facility] and PDLCasey on specimen collected 5/25/23 , [id number] from [facility]. Order completed via online portal. Copy of patient's insurance card, face sheet and path report along with test requisition form  was submitted via online portal to [facility]. Email confirmation received of order. [facility] is to obtain specimen. All forms given to Dr. Vinicio Arias office staff to be scanned into patient's EMR.

## 2023-05-31 ENCOUNTER — OFFICE VISIT (OUTPATIENT)
Dept: ONCOLOGY | Age: 68
End: 2023-05-31
Payer: MEDICARE

## 2023-05-31 ENCOUNTER — HOSPITAL ENCOUNTER (OUTPATIENT)
Dept: INFUSION THERAPY | Age: 68
Discharge: HOME | End: 2023-05-31

## 2023-05-31 VITALS
TEMPERATURE: 96.8 F | WEIGHT: 195.6 LBS | DIASTOLIC BLOOD PRESSURE: 75 MMHG | BODY MASS INDEX: 30.7 KG/M2 | HEART RATE: 91 BPM | SYSTOLIC BLOOD PRESSURE: 146 MMHG | HEIGHT: 67 IN | OXYGEN SATURATION: 100 %

## 2023-05-31 DIAGNOSIS — C34.90 NON-SMALL CELL LUNG CANCER, UNSPECIFIED LATERALITY (HCC): Primary | ICD-10-CM

## 2023-05-31 PROCEDURE — 99214 OFFICE O/P EST MOD 30 MIN: CPT | Performed by: INTERNAL MEDICINE

## 2023-05-31 PROCEDURE — G8427 DOCREV CUR MEDS BY ELIG CLIN: HCPCS | Performed by: INTERNAL MEDICINE

## 2023-05-31 PROCEDURE — 1036F TOBACCO NON-USER: CPT | Performed by: INTERNAL MEDICINE

## 2023-05-31 PROCEDURE — 3017F COLORECTAL CA SCREEN DOC REV: CPT | Performed by: INTERNAL MEDICINE

## 2023-05-31 PROCEDURE — 1123F ACP DISCUSS/DSCN MKR DOCD: CPT | Performed by: INTERNAL MEDICINE

## 2023-05-31 PROCEDURE — G8417 CALC BMI ABV UP PARAM F/U: HCPCS | Performed by: INTERNAL MEDICINE

## 2023-05-31 NOTE — PROGRESS NOTES
Patient refused printed AVS, pt states they have MYCHART. All questions answered.
[address] MED ONCOLOGY  [address] [facility] [address]  Dept: [phone]

## 2023-06-01 ENCOUNTER — TELEPHONE (OUTPATIENT)
Dept: CASE MANAGEMENT | Age: 68
End: 2023-06-01

## 2023-06-01 NOTE — TELEPHONE ENCOUNTER
Received e-mail from [facility] that the patient's specimen did not meet criteria for PD-L1 testing. Cdx is in progress. [facility] has contacted the pathology lab requesting an alternate block to attempt to run PD-L1 on. Will update Dr. Neftali Granados.

## 2023-06-02 ENCOUNTER — TELEPHONE (OUTPATIENT)
Dept: CASE MANAGEMENT | Age: 68
End: 2023-06-02

## 2023-06-02 NOTE — TELEPHONE ENCOUNTER
Spoke with patient offering assistance with scheduling his brain MRI if needed. He said he has it taken care of and will be getting it done in [facility] where he will also receive radiation. I will continue to follow up on these results.

## 2023-06-08 ENCOUNTER — TELEPHONE (OUTPATIENT)
Dept: CASE MANAGEMENT | Age: 68
End: 2023-06-08

## 2023-06-08 NOTE — TELEPHONE ENCOUNTER
Received notification from [employer] regarding patient's testing. [facility] Cdx and PD-L1 testing has failed on both the requested specimen and alternate specimen due to low tumor purity. I will notify Dr. Larose Bamberger that both specimens were insufficient for testing.

## 2023-06-09 ENCOUNTER — TELEPHONE (OUTPATIENT)
Dept: CASE MANAGEMENT | Age: 68
End: 2023-06-09

## 2023-06-09 NOTE — TELEPHONE ENCOUNTER
Spoke with patient and let him know that his pathology specimens failed through [address]. He voiced understanding.

## 2023-06-09 NOTE — TELEPHONE ENCOUNTER
Per Dr. Schroeder Dense request, [facility] ordered on patient's original lung specimen with Dr. Moon Kemp from 2020. Order from scanned into media.

## 2023-06-21 ENCOUNTER — HOSPITAL ENCOUNTER (OUTPATIENT)
Dept: INFUSION THERAPY | Age: 68
Discharge: HOME | End: 2023-06-21

## 2023-06-21 ENCOUNTER — TELEPHONE (OUTPATIENT)
Dept: CASE MANAGEMENT | Age: 68
End: 2023-06-21

## 2023-06-21 ENCOUNTER — OFFICE VISIT (OUTPATIENT)
Dept: ONCOLOGY | Age: 68
End: 2023-06-21
Payer: MEDICARE

## 2023-06-21 VITALS
WEIGHT: 195.8 LBS | SYSTOLIC BLOOD PRESSURE: 137 MMHG | OXYGEN SATURATION: 100 % | BODY MASS INDEX: 30.73 KG/M2 | DIASTOLIC BLOOD PRESSURE: 84 MMHG | HEART RATE: 83 BPM | TEMPERATURE: 97 F | HEIGHT: 67 IN

## 2023-06-21 DIAGNOSIS — C34.90 NON-SMALL CELL LUNG CANCER, UNSPECIFIED LATERALITY (HCC): Primary | ICD-10-CM

## 2023-06-21 PROCEDURE — 1036F TOBACCO NON-USER: CPT | Performed by: INTERNAL MEDICINE

## 2023-06-21 PROCEDURE — G8417 CALC BMI ABV UP PARAM F/U: HCPCS | Performed by: INTERNAL MEDICINE

## 2023-06-21 PROCEDURE — G8427 DOCREV CUR MEDS BY ELIG CLIN: HCPCS | Performed by: INTERNAL MEDICINE

## 2023-06-21 PROCEDURE — 3017F COLORECTAL CA SCREEN DOC REV: CPT | Performed by: INTERNAL MEDICINE

## 2023-06-21 PROCEDURE — 99205 OFFICE O/P NEW HI 60 MIN: CPT | Performed by: INTERNAL MEDICINE

## 2023-06-21 PROCEDURE — 1123F ACP DISCUSS/DSCN MKR DOCD: CPT | Performed by: INTERNAL MEDICINE

## 2023-06-21 NOTE — PROGRESS NOTES
Patient is scheduled 7/24/23 with  in [place]. This nurse calked that office to move up appointment per Dr. Yakov Salamanca request. Per their office there are no available appointments prior to that date. A message is being forwarded to Dr. Ancelmo Perales and his team detailing Dr. Yakov Salamanca request to move appointment. A phone call will be made to the patient if the appointment can be changed.

## 2023-06-21 NOTE — PROGRESS NOTES
Patient does not have follow- up information.  did not have RTC in disposition at time of check-out. We will call patient with appt. time and day.

## 2023-06-21 NOTE — TELEPHONE ENCOUNTER
[facility] CDX and PD-L1 testing ordered on patient's EUS specimen from 6/15/23 per Dr. Emely Orourke request. Order form scanned into media.

## 2023-06-21 NOTE — PROGRESS NOTES
Department of [facility] Oncology  Attending Clinic Note    Reason for Visit: F/U on a patient with Non Small Cell Lung Cancer    PCP:  Erika Ma MD    History of Present Illness:  77 y/o male with NSCLC    CT chest 07/17/2020:  Scattered bilateral pulmonary nodules the largest being a solid nodule in RUL measuring 1.6 cm. PET/CT scan 07/29/2020:  Images reveal abnormal tracer uptake in the right lung at the level the pulmonary nodule peak SUV is 8.4  No convincing evidence of metastatic disease. Robotic right upper lobectomy mediastinal lymph node dissection on 8/21/2020  A. Right upper lobe of lung, wedge resection: Invasive, well   differentiated adenocarcinoma, see cancer case summary. Carcinoma invades visceral pleura. The parenchymal margin is negative for involvement by malignancy. Intra-alveolar pigmented macrophages and mild emphysema. B.  Superior mediastinal lymph node, excision: Fragments of anthracotic   lymph node with sinus histiocytosis, negative for involvement by   carcinoma. C. Inferior pulmonary ligament lymph node, excision: Anthracotic lymph   node with sinus histiocytosis, negative for involvement by carcinoma. D.  Subcarinal lymph node #1, excision: Fragments of anthracotic lymph   node with sinus histiocytosis, negative for involvement by carcinoma. E.  Subcarinal lymph node #2, excision: Fragments of anthracotic lymph   node with marked sinus histiocytosis, negative for involvement by   carcinoma. F.  Right upper lobe of lung, lobectomy: Patchy interstitial chronic   inflammation. Scattered intra-alveolar hemosiderin laden macrophages. Focal subpleural fibrosis. Negative for residual carcinoma. Bronchial and vascular margins are negative for involvement by carcinoma. CANCER CASE SUMMARY: Procedure:  Wedge resection with completion lobectomy   Specimen laterality: Right   Tumor site: Upper lobe   Tumor size: 1.2 cm in greatest

## 2023-06-22 ENCOUNTER — TELEPHONE (OUTPATIENT)
Dept: CASE MANAGEMENT | Age: 68
End: 2023-06-22

## 2023-06-22 NOTE — TELEPHONE ENCOUNTER
Patient is going to be seen for the remainder of his care at [facility] He called their department with questions. I was asked to reach out to the patient to answer any questions he has. I went over the side effects he may experience related to chemotherapy to the best of my ability. Patient is authorized to start chemotherapy. He is currently scheduled to begin on 7/11, but is hoping to start next week if possible. If Dr. Mary Burns is okay with it, Y will change his appointments to next week. The patient also mentioned that Dr. Mary Burns said he would be referred to [facility]. I do not see that referral placed. I will touch base with her tomorrow to inquire if that is something she would like to have done for him. Lastly, he mentioned that he has a history of hepatitis B and he wants to make sure Dr. Mary Burns is aware and is running appropriate blood work for this. I will communicate this to her as well.

## 2023-06-23 ENCOUNTER — TELEPHONE (OUTPATIENT)
Dept: ONCOLOGY | Age: 68
End: 2023-06-23

## 2023-06-23 DIAGNOSIS — C34.90 NON-SMALL CELL LUNG CANCER, UNSPECIFIED LATERALITY (HCC): Primary | ICD-10-CM

## 2023-06-23 NOTE — TELEPHONE ENCOUNTER
Was informed by Benja Jordan that MrRenee Alex had some questions regarding his chemotherapy. Lizbethhollie Odom via telephone to discuss his questions. His family members that are in the medical field were questioning timing of folic acid and K77 injections as PI states these should be started 5-7 prior to starting pemetrexed. Explained that this was the way pemetrexed was studied and approved, but post-approval studies such as the [facility] study support that starting supplementation at the same time as treatment does not lead to increased side effects. Since folic acid is OTC, I offered that he can buy 1 mg folic acid tablets and start them now if he would like. Patient also asked about venous access since he doesn't have reliable veins. I asked if a port was discussed and he said it was briefly. I briefly explained what a port is but differed further discussion to Dr. So Kay. Lastly, patient stated that he had a history of Hep B. I explained that its standard to check Hep B labs prior to treatment initiation and these can be done when he comes in for a chemo teach. Patient was appreciative of the call and all questions at this time were answered.     Jesus Espitia, PharmD, BCOP

## 2023-06-26 ENCOUNTER — TELEPHONE (OUTPATIENT)
Dept: ONCOLOGY | Age: 68
End: 2023-06-26

## 2023-06-27 ENCOUNTER — TELEPHONE (OUTPATIENT)
Dept: SURGERY | Age: 68
End: 2023-06-27

## 2023-06-27 DIAGNOSIS — C34.90 NON-SMALL CELL LUNG CANCER, UNSPECIFIED LATERALITY (HCC): Primary | ICD-10-CM

## 2023-06-28 ENCOUNTER — TELEPHONE (OUTPATIENT)
Dept: ONCOLOGY | Age: 68
End: 2023-06-28

## 2023-06-28 PROBLEM — Z11.59 ENCOUNTER FOR SCREENING FOR OTHER VIRAL DISEASES: Status: ACTIVE | Noted: 2023-06-28

## 2023-06-28 PROBLEM — Z79.899 OTHER LONG TERM (CURRENT) DRUG THERAPY: Status: ACTIVE | Noted: 2023-06-28

## 2023-06-29 ENCOUNTER — HOSPITAL ENCOUNTER (OUTPATIENT)
Dept: INFUSION THERAPY | Age: 68
End: 2023-06-29

## 2023-06-29 ENCOUNTER — TELEPHONE (OUTPATIENT)
Dept: SURGERY | Age: 68
End: 2023-06-29

## 2023-06-29 DIAGNOSIS — Z79.899 OTHER LONG TERM (CURRENT) DRUG THERAPY: Primary | ICD-10-CM

## 2023-06-29 DIAGNOSIS — Z11.59 ENCOUNTER FOR SCREENING FOR OTHER VIRAL DISEASES: ICD-10-CM

## 2023-06-30 ENCOUNTER — HOSPITAL ENCOUNTER (OUTPATIENT)
Dept: INFUSION THERAPY | Age: 68
End: 2023-06-30

## 2023-07-05 ENCOUNTER — TELEPHONE (OUTPATIENT)
Dept: CASE MANAGEMENT | Age: 68
End: 2023-07-05

## 2023-07-05 NOTE — TELEPHONE ENCOUNTER
Patient's PD-L1 [facility] testing on his specimen from 6/15 was cancelled due to insufficient tissue for testing. Per [facility] representative, there was only one malignant block so there is not another block that can be tested on. His CDX report is still pending and is expected to result on 7/10. Will alert Dr. Satnam Smith and [place] in [facility].

## 2023-09-14 NOTE — BRIEF OP NOTE
Brief Postoperative Note    Khang Fernandez  YOB: 1955  [id number]    Pre-operative Diagnosis: RUL lung nodule    Post-operative Diagnosis: Same    Operation: Right robotic VATS/Robotic RUL wedge/Robotic RULobectomy/Mediastinal lymph node dissection/Intercostal nerve block    Anesthesia: General    Surgeon: Gracie Jenkins    Assistants: Abraham Saleh    Estimated Blood Loss: 098     Complications: None    Specimens:   ID Type Source Tests Collected by Time Destination   A : RIGHT UPPER  LOBE WEDGE Tissue Lung SURGICAL PATHOLOGY You Patterson MD 8/21/2020 1027    B : SUPERIOR [place] PACKET Tissue Tissue SURGICAL PATHOLOGY You Patterson MD 8/21/2020 1046    C : INFERIOR PULMONARY LIGAMENT LYMPHNODE Tissue Tissue SURGICAL PATHOLOGY You Patterson MD 8/21/2020 1035    D : SUBCARINAL LYMPHNODE 1 Tissue Tissue SURGICAL PATHOLOGY You Patterson MD 8/21/2020 1039    E : SUBCARINAL LYMPHNODE 2 Tissue Tissue SURGICAL PATHOLOGY You Patterson MD 8/21/2020 1042    F : RIGHT UPPER LOBE Tissue Lung SURGICAL PATHOLOGY You Patterson MD 8/21/2020 1105        Implants:  Implant Name Type Inv.  Item Serial No.  Lot No. LRB No. Used Action   SEALANT PROGEL PLEURAL AIR LEAK Cement SEALANT PROGEL PLEURAL AIR LEAK  [facility] [id number] Right 1 Implanted         Drains:   Chest Tube 1 Right Mediastinal 28 [place] (Active)       Urethral Catheter Straight-tip (Active)       Findings: [employer]    Electronically signed by You Patterson MD on 8/21/2020 at 11:39 AM Health Maintenance Due   Topic Date Due   • COVID-19 Vaccine (4 - Pfizer series) 08/22/2022   • Meningococcal Vaccine (2 - 2-dose series) 04/13/2023   • Influenza Vaccine (1) 09/01/2023       Patient is due for topics as listed above but is not proceeding with Immunization(s) COVID-19, Influenza and Meningococcal at this time. Education provided.    Recent PHQ 2/9 Score    PHQ 2:  PHQ 2 Score Peds PHQ 2 Score Peds PHQ 2 Interpretation   9/14/2023  10:02 AM 0 No further screening needed       PHQ 9:  PHQ 9 Score Peds PHQ 9 Score   9/14/2023  10:02 AM 0

## (undated) DEVICE — 1.5L THIN WALL CAN: Brand: CRD

## (undated) DEVICE — NEEDLE SPNL 20GA L3.5IN YEL HUB S STL REG WALL FIT STYL W/

## (undated) DEVICE — GLOVE ORANGE PI 7 1/2   MSG9075

## (undated) DEVICE — PATIENT RETURN ELECTRODE, SINGLE-USE, CONTACT QUALITY MONITORING, ADULT, WITH 9FT CORD, FOR PATIENTS WEIGING OVER 33LBS. (15KG): Brand: MEGADYNE

## (undated) DEVICE — Z DISCONTINUED NO SUB IDED BAG SPEC RETRV M C240ML MOUTH 7.3MM L17CM SHFT 10MM NYL EZEE

## (undated) DEVICE — GARMENT,MEDLINE,DVT,INT,CALF,MED, GEN2: Brand: MEDLINE

## (undated) DEVICE — DAVINCI THORACIC INSTRUMENT SET

## (undated) DEVICE — PACK,UNIV, II AURORA: Brand: MEDLINE

## (undated) DEVICE — KIT SURG W7XL11IN 2 PKT UNTREATED NA

## (undated) DEVICE — INTENDED FOR TISSUE SEPARATION, AND OTHER PROCEDURES THAT REQUIRE A SHARP SURGICAL BLADE TO PUNCTURE OR CUT.: Brand: BARD-PARKER ® STAINLESS STEEL BLADES

## (undated) DEVICE — ALCOHOL 70% RUBBING 16OZ

## (undated) DEVICE — CURVED-TIP STAPLER 30: Brand: ENDOWRIST

## (undated) DEVICE — 3M™ MEDIPORE™ + PAD 3564: Brand: 3M™ MEDIPORE™

## (undated) DEVICE — STANDARD HYPODERMIC NEEDLE,ALUMINUM HUB: Brand: MONOJECT

## (undated) DEVICE — GOWN,SIRUS,FABRNF,XL,20/CS: Brand: MEDLINE

## (undated) DEVICE — LABEL MED 4 IN SURG PANEL W/ PEN STRL

## (undated) DEVICE — DRAPE THER FLUID WARMING 66X44 IN FLAT SLUSH DBL DISC ORS

## (undated) DEVICE — LOOP VES W25MM THK1MM MAXI RED SIL FLD REPELLENT 100 PER

## (undated) DEVICE — SEAL

## (undated) DEVICE — SOLUTION IV IRRIG WATER 1000ML POUR BRL 2F7114

## (undated) DEVICE — Device

## (undated) DEVICE — AGENT HEMSTAT W4XL8IN OXIDIZED REGENERATED CELOS ABSRB

## (undated) DEVICE — Z DUP USE 2257490 ADHESIVE SKIN CLSRE 036ML TPCL 2CTL CNCRLTE HIGH VSCSTY DRMB

## (undated) DEVICE — PAD,NON-ADHERENT,3X8,STERILE,LF,1/PK: Brand: MEDLINE

## (undated) DEVICE — INSUFFLATION NEEDLE TO ESTABLISH PNEUMOPERITONEUM.: Brand: INSUFFLATION NEEDLE

## (undated) DEVICE — SHEET,DRAPE,40X58,STERILE: Brand: MEDLINE

## (undated) DEVICE — MARYLAND BIPOLAR FORCEPS: Brand: ENDOWRIST

## (undated) DEVICE — STAPLER 60 RELOAD BLACK: Brand: SUREFORM

## (undated) DEVICE — [HIGH FLOW INSUFFLATOR,  DO NOT USE IF PACKAGE IS DAMAGED,  KEEP DRY,  KEEP AWAY FROM SUNLIGHT,  PROTECT FROM HEAT AND RADIOACTIVE SOURCES.]: Brand: PNEUMOSURE

## (undated) DEVICE — GOWN,SIRUS,FABRNF,L,20/CS: Brand: MEDLINE

## (undated) DEVICE — BLADELESS OBTURATOR: Brand: WECK VISTA

## (undated) DEVICE — GLOVE SURG SZ 7.5 L11.73IN FNGR THK9.8MIL STRW LTX POLYMER

## (undated) DEVICE — BAG SPEC RETRIEVALXL C2000ML MOUTH 14MM L27CM SHFT 15MM NYL

## (undated) DEVICE — PUMP SUCT THOR THOPAZ

## (undated) DEVICE — STAPLER 30 RELOAD GREEN: Brand: ENDOWRIST

## (undated) DEVICE — GLOVE SURG SZ 6 THK91MIL LTX FREE SYN POLYISOPRENE ANTI

## (undated) DEVICE — CANNULA SEAL

## (undated) DEVICE — GLOVE SURG SZ 65 THK91MIL LTX FREE SYN POLYISOPRENE

## (undated) DEVICE — TOWEL,OR,DSP,ST,BLUE,STD,6/PK,12PK/CS: Brand: MEDLINE

## (undated) DEVICE — CLOTH SURG PREP PREOPERATIVE CHLORHEXIDINE GLUC 2% READYPREP

## (undated) DEVICE — ROBOTIC THORACIC EXTRAS

## (undated) DEVICE — COLUMN DRAPE

## (undated) DEVICE — TRI-LUMEN FILTERED TUBE SET WITH ACTIVATED CHARCOAL FILTER: Brand: AIRSEAL

## (undated) DEVICE — ARM DRAPE

## (undated) DEVICE — SYRINGE 20ML LL S/C 50

## (undated) DEVICE — SET INST DAVINCI ACCESSORIES

## (undated) DEVICE — TIP-UP FENESTRATED GRASPER: Brand: ENDOWRIST

## (undated) DEVICE — E-Z CLEAN, NON-STICK, PTFE COATED, ELECTROSURGICAL BLADE ELECTRODE, MODIFIED EXTENDED INSULATION, 2.5 INCH (6.35 CM): Brand: MEGADYNE

## (undated) DEVICE — CURITY FLEXIBLE ADHESIVE BANDAGE: Brand: CURITY

## (undated) DEVICE — AIRSEAL 12 MM ACCESS PORT AND PALM GRIP OBTURATOR WITH BLADELESS OPTICAL TIP, 100 MM LENGTH: Brand: AIRSEAL

## (undated) DEVICE — PUMP SUC IRR TBNG L10FT W/ HNDPC ASSEMB STRYKEFLOW 2

## (undated) DEVICE — SCOPE DAVINCI XI 30 DEG W/CORD

## (undated) DEVICE — GLOVE ORANGE PI 7   MSG9070

## (undated) DEVICE — SURGICAL PROCEDURE PACK BASIC

## (undated) DEVICE — BLADE CLIPPER GEN PURP NS

## (undated) DEVICE — TAPE ADH W2INXL10YD PLAS TRNSPAR H2O RESIST HYPOALRG CURAD

## (undated) DEVICE — STAPLER SHEATH: Brand: ENDOWRIST

## (undated) DEVICE — REDUCER: Brand: ENDOWRIST

## (undated) DEVICE — MARKER,SKIN,PREP-RESISTANT,NON-STERILE: Brand: MEDLINE

## (undated) DEVICE — TOTAL TRAY, 16FR 10ML SIL FOLEY, URN: Brand: MEDLINE

## (undated) DEVICE — TAPE ADH W2INXL10YD WHT PAPR GENTLE BRTH FLX COMFORTABLE

## (undated) DEVICE — APPLICATOR MEDICATED 26 CC SOLUTION HI LT ORNG CHLORAPREP

## (undated) DEVICE — CADIERE FORCEPS: Brand: ENDOWRIST

## (undated) DEVICE — SYRINGE MED 50ML LUERLOCK TIP

## (undated) DEVICE — SOLUTION IV IRRIG POUR BRL 0.9% SODIUM CHL 2F7124

## (undated) DEVICE — 3M™ STERI-DRAPE™ INCISE DRAPE 1050 (60CM X 45CM): Brand: STERI-DRAPE™

## (undated) DEVICE — GLOVE ORANGE PI 8   MSG9080

## (undated) DEVICE — CANNULA STAPLER ENDOWRIST 12MM

## (undated) DEVICE — PAD,NON-ADHERENT,2X3,STERILE,LF,1/PK: Brand: MEDLINE

## (undated) DEVICE — POUCH, INSTRUMENT, 3POCKET, INVISISHIELD: Brand: MEDLINE

## (undated) DEVICE — SOLUTION IV 1000ML 0.9% SOD CHL PH 5 INJ USP VIAFLX PLAS